# Patient Record
Sex: MALE | Race: WHITE | NOT HISPANIC OR LATINO | Employment: UNEMPLOYED | URBAN - NONMETROPOLITAN AREA
[De-identification: names, ages, dates, MRNs, and addresses within clinical notes are randomized per-mention and may not be internally consistent; named-entity substitution may affect disease eponyms.]

---

## 2024-08-18 ENCOUNTER — HOSPITAL ENCOUNTER (EMERGENCY)
Facility: OTHER | Age: 44
Discharge: HOME | End: 2024-08-18
Attending: PHYSICIAN ASSISTANT | Admitting: PHYSICIAN ASSISTANT

## 2024-08-18 ENCOUNTER — APPOINTMENT (OUTPATIENT)
Dept: GENERAL RADIOLOGY | Facility: OTHER | Age: 44
End: 2024-08-18
Attending: PHYSICIAN ASSISTANT

## 2024-08-18 ENCOUNTER — APPOINTMENT (OUTPATIENT)
Dept: CT IMAGING | Facility: OTHER | Age: 44
End: 2024-08-18
Attending: PHYSICIAN ASSISTANT

## 2024-08-18 VITALS
HEIGHT: 69 IN | WEIGHT: 133 LBS | DIASTOLIC BLOOD PRESSURE: 91 MMHG | BODY MASS INDEX: 19.7 KG/M2 | OXYGEN SATURATION: 96 % | SYSTOLIC BLOOD PRESSURE: 129 MMHG | RESPIRATION RATE: 18 BRPM | HEART RATE: 76 BPM | TEMPERATURE: 97.2 F

## 2024-08-18 DIAGNOSIS — K52.9 ENTERITIS: ICD-10-CM

## 2024-08-18 DIAGNOSIS — R10.9 ABDOMINAL PAIN: ICD-10-CM

## 2024-08-18 DIAGNOSIS — R11.2 NAUSEA VOMITING AND DIARRHEA: ICD-10-CM

## 2024-08-18 DIAGNOSIS — R19.7 NAUSEA VOMITING AND DIARRHEA: ICD-10-CM

## 2024-08-18 LAB
ALBUMIN SERPL BCG-MCNC: 3.7 G/DL (ref 3.5–5.2)
ALBUMIN UR-MCNC: 30 MG/DL
ALP SERPL-CCNC: 168 U/L (ref 40–150)
ALT SERPL W P-5'-P-CCNC: 10 U/L (ref 0–70)
AMPHETAMINES UR QL SCN: ABNORMAL
ANION GAP SERPL CALCULATED.3IONS-SCNC: 10 MMOL/L (ref 7–15)
APPEARANCE UR: CLEAR
AST SERPL W P-5'-P-CCNC: 20 U/L (ref 0–45)
BARBITURATES UR QL SCN: ABNORMAL
BASOPHILS # BLD AUTO: 0 10E3/UL (ref 0–0.2)
BASOPHILS NFR BLD AUTO: 0 %
BENZODIAZ UR QL SCN: ABNORMAL
BILIRUB SERPL-MCNC: 0.5 MG/DL
BILIRUB UR QL STRIP: NEGATIVE
BUN SERPL-MCNC: 11.6 MG/DL (ref 6–20)
BZE UR QL SCN: ABNORMAL
CALCIUM SERPL-MCNC: 8.7 MG/DL (ref 8.8–10.4)
CANNABINOIDS UR QL SCN: ABNORMAL
CHLORIDE SERPL-SCNC: 99 MMOL/L (ref 98–107)
COLOR UR AUTO: YELLOW
CREAT SERPL-MCNC: 0.77 MG/DL (ref 0.67–1.17)
EGFRCR SERPLBLD CKD-EPI 2021: >90 ML/MIN/1.73M2
EOSINOPHIL # BLD AUTO: 0.4 10E3/UL (ref 0–0.7)
EOSINOPHIL NFR BLD AUTO: 5 %
ERYTHROCYTE [DISTWIDTH] IN BLOOD BY AUTOMATED COUNT: 14.5 % (ref 10–15)
FENTANYL UR QL: ABNORMAL
GLUCOSE SERPL-MCNC: 152 MG/DL (ref 70–99)
GLUCOSE UR STRIP-MCNC: NEGATIVE MG/DL
HCO3 SERPL-SCNC: 26 MMOL/L (ref 22–29)
HCT VFR BLD AUTO: 41.9 % (ref 40–53)
HGB BLD-MCNC: 13.6 G/DL (ref 13.3–17.7)
HGB UR QL STRIP: NEGATIVE
HOLD SPECIMEN: NORMAL
HOLD SPECIMEN: NORMAL
IMM GRANULOCYTES # BLD: 0 10E3/UL
IMM GRANULOCYTES NFR BLD: 0 %
KETONES UR STRIP-MCNC: NEGATIVE MG/DL
LACTATE SERPL-SCNC: 1.5 MMOL/L (ref 0.7–2)
LEUKOCYTE ESTERASE UR QL STRIP: NEGATIVE
LIPASE SERPL-CCNC: 7 U/L (ref 13–60)
LYMPHOCYTES # BLD AUTO: 1.9 10E3/UL (ref 0.8–5.3)
LYMPHOCYTES NFR BLD AUTO: 22 %
MAGNESIUM SERPL-MCNC: 1.9 MG/DL (ref 1.7–2.3)
MCH RBC QN AUTO: 29.3 PG (ref 26.5–33)
MCHC RBC AUTO-ENTMCNC: 32.5 G/DL (ref 31.5–36.5)
MCV RBC AUTO: 90 FL (ref 78–100)
MONOCYTES # BLD AUTO: 0.5 10E3/UL (ref 0–1.3)
MONOCYTES NFR BLD AUTO: 6 %
MUCOUS THREADS #/AREA URNS LPF: PRESENT /LPF
NEUTROPHILS # BLD AUTO: 5.8 10E3/UL (ref 1.6–8.3)
NEUTROPHILS NFR BLD AUTO: 68 %
NITRATE UR QL: NEGATIVE
NRBC # BLD AUTO: 0 10E3/UL
NRBC BLD AUTO-RTO: 0 /100
OPIATES UR QL SCN: ABNORMAL
PCP QUAL URINE (ROCHE): ABNORMAL
PH UR STRIP: 6.5 [PH] (ref 5–9)
PLATELET # BLD AUTO: 222 10E3/UL (ref 150–450)
POTASSIUM SERPL-SCNC: 4.3 MMOL/L (ref 3.4–5.3)
PROT SERPL-MCNC: 6 G/DL (ref 6.4–8.3)
RBC # BLD AUTO: 4.64 10E6/UL (ref 4.4–5.9)
RBC URINE: 1 /HPF
SODIUM SERPL-SCNC: 135 MMOL/L (ref 135–145)
SP GR UR STRIP: >1.05 (ref 1–1.03)
TROPONIN T SERPL HS-MCNC: <6 NG/L
TSH SERPL DL<=0.005 MIU/L-ACNC: 0.47 UIU/ML (ref 0.3–4.2)
UROBILINOGEN UR STRIP-MCNC: NORMAL MG/DL
WBC # BLD AUTO: 8.6 10E3/UL (ref 4–11)
WBC URINE: 9 /HPF

## 2024-08-18 PROCEDURE — 96361 HYDRATE IV INFUSION ADD-ON: CPT | Performed by: PHYSICIAN ASSISTANT

## 2024-08-18 PROCEDURE — 83605 ASSAY OF LACTIC ACID: CPT | Performed by: PHYSICIAN ASSISTANT

## 2024-08-18 PROCEDURE — 250N000011 HC RX IP 250 OP 636: Performed by: PHYSICIAN ASSISTANT

## 2024-08-18 PROCEDURE — 96375 TX/PRO/DX INJ NEW DRUG ADDON: CPT | Performed by: PHYSICIAN ASSISTANT

## 2024-08-18 PROCEDURE — 84484 ASSAY OF TROPONIN QUANT: CPT | Performed by: PHYSICIAN ASSISTANT

## 2024-08-18 PROCEDURE — 83735 ASSAY OF MAGNESIUM: CPT | Performed by: PHYSICIAN ASSISTANT

## 2024-08-18 PROCEDURE — 74177 CT ABD & PELVIS W/CONTRAST: CPT

## 2024-08-18 PROCEDURE — 96376 TX/PRO/DX INJ SAME DRUG ADON: CPT | Performed by: PHYSICIAN ASSISTANT

## 2024-08-18 PROCEDURE — 96374 THER/PROPH/DIAG INJ IV PUSH: CPT | Mod: XU | Performed by: PHYSICIAN ASSISTANT

## 2024-08-18 PROCEDURE — 99285 EMERGENCY DEPT VISIT HI MDM: CPT | Mod: 25 | Performed by: PHYSICIAN ASSISTANT

## 2024-08-18 PROCEDURE — 80307 DRUG TEST PRSMV CHEM ANLYZR: CPT | Performed by: PHYSICIAN ASSISTANT

## 2024-08-18 PROCEDURE — 71045 X-RAY EXAM CHEST 1 VIEW: CPT

## 2024-08-18 PROCEDURE — 81001 URINALYSIS AUTO W/SCOPE: CPT | Mod: XU | Performed by: PHYSICIAN ASSISTANT

## 2024-08-18 PROCEDURE — 99285 EMERGENCY DEPT VISIT HI MDM: CPT | Performed by: PHYSICIAN ASSISTANT

## 2024-08-18 PROCEDURE — 80053 COMPREHEN METABOLIC PANEL: CPT | Performed by: PHYSICIAN ASSISTANT

## 2024-08-18 PROCEDURE — 85025 COMPLETE CBC W/AUTO DIFF WBC: CPT | Performed by: PHYSICIAN ASSISTANT

## 2024-08-18 PROCEDURE — 84443 ASSAY THYROID STIM HORMONE: CPT | Performed by: PHYSICIAN ASSISTANT

## 2024-08-18 PROCEDURE — 36415 COLL VENOUS BLD VENIPUNCTURE: CPT | Performed by: PHYSICIAN ASSISTANT

## 2024-08-18 PROCEDURE — 250N000013 HC RX MED GY IP 250 OP 250 PS 637: Performed by: PHYSICIAN ASSISTANT

## 2024-08-18 PROCEDURE — 83690 ASSAY OF LIPASE: CPT | Performed by: PHYSICIAN ASSISTANT

## 2024-08-18 PROCEDURE — 258N000003 HC RX IP 258 OP 636: Performed by: PHYSICIAN ASSISTANT

## 2024-08-18 PROCEDURE — 250N000009 HC RX 250: Performed by: PHYSICIAN ASSISTANT

## 2024-08-18 PROCEDURE — 93010 ELECTROCARDIOGRAM REPORT: CPT | Performed by: INTERNAL MEDICINE

## 2024-08-18 PROCEDURE — 93005 ELECTROCARDIOGRAM TRACING: CPT | Performed by: PHYSICIAN ASSISTANT

## 2024-08-18 RX ORDER — ONDANSETRON 2 MG/ML
4 INJECTION INTRAMUSCULAR; INTRAVENOUS EVERY 30 MIN PRN
Status: DISCONTINUED | OUTPATIENT
Start: 2024-08-18 | End: 2024-08-18 | Stop reason: HOSPADM

## 2024-08-18 RX ORDER — HYDROXYZINE PAMOATE 25 MG/1
25 CAPSULE ORAL ONCE
Status: COMPLETED | OUTPATIENT
Start: 2024-08-18 | End: 2024-08-18

## 2024-08-18 RX ORDER — KETOROLAC TROMETHAMINE 15 MG/ML
15 INJECTION, SOLUTION INTRAMUSCULAR; INTRAVENOUS ONCE
Status: COMPLETED | OUTPATIENT
Start: 2024-08-18 | End: 2024-08-18

## 2024-08-18 RX ORDER — ONDANSETRON 4 MG/1
4 TABLET, ORALLY DISINTEGRATING ORAL EVERY 6 HOURS PRN
Qty: 10 TABLET | Refills: 0 | Status: SHIPPED | OUTPATIENT
Start: 2024-08-18

## 2024-08-18 RX ORDER — OXYCODONE AND ACETAMINOPHEN 5; 325 MG/1; MG/1
1 TABLET ORAL EVERY 6 HOURS PRN
Qty: 6 TABLET | Refills: 0 | Status: SHIPPED | OUTPATIENT
Start: 2024-08-18

## 2024-08-18 RX ORDER — IOPAMIDOL 755 MG/ML
76 INJECTION, SOLUTION INTRAVASCULAR ONCE
Status: COMPLETED | OUTPATIENT
Start: 2024-08-18 | End: 2024-08-18

## 2024-08-18 RX ADMIN — PANTOPRAZOLE SODIUM 40 MG: 40 INJECTION, POWDER, FOR SOLUTION INTRAVENOUS at 14:25

## 2024-08-18 RX ADMIN — SODIUM CHLORIDE 60 ML: 9 INJECTION, SOLUTION INTRAVENOUS at 15:27

## 2024-08-18 RX ADMIN — HYDROMORPHONE HYDROCHLORIDE 1 MG: 1 INJECTION, SOLUTION INTRAMUSCULAR; INTRAVENOUS; SUBCUTANEOUS at 14:26

## 2024-08-18 RX ADMIN — SODIUM CHLORIDE 1809 ML: 9 INJECTION, SOLUTION INTRAVENOUS at 14:23

## 2024-08-18 RX ADMIN — SODIUM CHLORIDE, POTASSIUM CHLORIDE, SODIUM LACTATE AND CALCIUM CHLORIDE 500 ML: 600; 310; 30; 20 INJECTION, SOLUTION INTRAVENOUS at 16:58

## 2024-08-18 RX ADMIN — IOPAMIDOL 76 ML: 755 INJECTION, SOLUTION INTRAVENOUS at 15:26

## 2024-08-18 RX ADMIN — HYDROXYZINE PAMOATE 25 MG: 25 CAPSULE ORAL at 16:22

## 2024-08-18 RX ADMIN — HYDROMORPHONE HYDROCHLORIDE 1 MG: 1 INJECTION, SOLUTION INTRAMUSCULAR; INTRAVENOUS; SUBCUTANEOUS at 16:22

## 2024-08-18 RX ADMIN — KETOROLAC TROMETHAMINE 15 MG: 15 INJECTION, SOLUTION INTRAMUSCULAR; INTRAVENOUS at 14:25

## 2024-08-18 ASSESSMENT — COLUMBIA-SUICIDE SEVERITY RATING SCALE - C-SSRS
1. IN THE PAST MONTH, HAVE YOU WISHED YOU WERE DEAD OR WISHED YOU COULD GO TO SLEEP AND NOT WAKE UP?: NO
2. HAVE YOU ACTUALLY HAD ANY THOUGHTS OF KILLING YOURSELF IN THE PAST MONTH?: NO
6. HAVE YOU EVER DONE ANYTHING, STARTED TO DO ANYTHING, OR PREPARED TO DO ANYTHING TO END YOUR LIFE?: NO

## 2024-08-18 ASSESSMENT — ACTIVITIES OF DAILY LIVING (ADL)
ADLS_ACUITY_SCORE: 35
ADLS_ACUITY_SCORE: 33

## 2024-08-18 NOTE — ED PROVIDER NOTES
"      EMERGENCY DEPARTMENT ENCOUNTER      NAME: Osvaldo Montague  AGE: 44 year old male  YOB: 1980  MRN: [id number]  EVALUATION DATE & TIME: No admission date for patient encounter.    PCP: No primary care provider on file.    ED PROVIDER: Pascual Al PA-C       CHIEF COMPLAINT:  Chief Complaint   Patient presents with    Abdominal Pain    Nausea, Vomiting, & Diarrhea         HPI  Osvaldo Montague is a pleasant 44 year old male who presents to the ER via a private vehicle for complaints of upper abdominal pain.  Patient states that symptoms began yesterday but has been worsening.  Patient been having nausea, vomiting, and diarrhea.  No bloody or melanotic stools.  No hematemesis.  Pain is described as moderate without radiation.  Nothing makes pain better or worse.  Patient states that he has a remote history of pancreatitis many years ago when he used to drink alcohol.  Currently on amoxicillin for dental infection.  Denies any alcohol use currently.  Denies any recent travel.  Denies any injury or trauma.  Patient also feeling short of breath and lightheaded.  Denies chest pain.      REVIEW OF SYSTEMS   Review of Systems  As above, otherwise ROS is unremarkable.      PAST MEDICAL HISTORY:  No past medical history on file.      PAST SURGICAL HISTORY:  No past surgical history on file.      CURRENT MEDICATIONS:    No current outpatient medications      ALLERGIES:  Allergies   Allergen Reactions    Tramadol          FAMILY HISTORY:  No family history on file.      SOCIAL HISTORY:        ==================================================================================================================================    PHYSICAL EXAM    VITAL SIGNS: BP (!) 129/91   Pulse 76   Temp 97.2  F (36.2  C) (Tympanic)   Resp 18   Ht 1.753 m (5' 9\")   Wt 60.3 kg (133 lb)   SpO2 96%   BMI 19.64 kg/m      Patient Vitals for the past 24 hrs:   BP Temp Temp src Pulse Resp SpO2 Height Weight " "  08/18/24 1900 (!) 129/91 -- -- 76 -- 96 % -- --   08/18/24 1812 133/89 -- -- -- -- 97 % -- --   08/18/24 1757 (!) 133/90 -- -- -- -- 97 % -- --   08/18/24 1742 (!) 137/92 -- -- -- -- 99 % -- --   08/18/24 1727 125/87 -- -- -- -- 97 % -- --   08/18/24 1712 125/88 -- -- -- -- 97 % -- --   08/18/24 1657 120/84 -- -- -- -- 97 % -- --   08/18/24 1612 123/88 -- -- -- -- 98 % -- --   08/18/24 1557 119/82 -- -- -- -- 97 % -- --   08/18/24 1542 121/87 -- -- -- -- 98 % -- --   08/18/24 1512 110/80 -- -- -- -- 98 % -- --   08/18/24 1458 108/75 -- -- 71 -- 97 % -- --   08/18/24 1443 113/80 -- -- 72 -- 98 % -- --   08/18/24 1428 118/79 -- -- -- -- -- -- --   08/18/24 1349 137/87 97.2  F (36.2  C) Tympanic 93 18 98 % 1.753 m (5' 9\") 60.3 kg (133 lb)       Physical Exam  Vitals and nursing note reviewed.   Constitutional:       General: He is not in acute distress.     Appearance: Normal appearance. He is well-developed. He is ill-appearing. He is not diaphoretic.   HENT:      Nose: Nose normal.      Mouth/Throat:      Mouth: Mucous membranes are moist.      Pharynx: Oropharynx is clear.   Eyes:      Conjunctiva/sclera: Conjunctivae normal.   Cardiovascular:      Rate and Rhythm: Normal rate and regular rhythm.      Pulses: Normal pulses.      Heart sounds: Normal heart sounds.   Pulmonary:      Effort: Pulmonary effort is normal.      Breath sounds: Normal breath sounds.   Chest:      Chest wall: No tenderness.   Abdominal:      General: Abdomen is flat. Bowel sounds are normal.      Palpations: Abdomen is soft.      Tenderness: There is abdominal tenderness in the epigastric area and left upper quadrant. There is guarding. There is no right CVA tenderness, left CVA tenderness or rebound.   Musculoskeletal:         General: Normal range of motion.      Cervical back: Normal range of motion.   Skin:     General: Skin is warm and dry.      Coloration: Skin is not jaundiced.   Neurological:      General: No focal deficit present. "      Mental Status: He is alert.   Psychiatric:         Mood and Affect: Mood normal.            ==================================================================================================================================    LABS & RADIOLOGY:  All pertinent labs reviewed and interpreted. Reviewed all pertinent imaging. Please see official radiology report.  Results for orders placed or performed during the hospital encounter of 08/18/24   XR Chest Port 1 View    Impression    IMPRESSION:      No acute cardiopulmonary process.      ROSANA GRECO MD         SYSTEM ID:  [id number]   CT Abdomen Pelvis w Contrast    Impression    IMPRESSION:  Several loops of small bowel throughout the abdomen demonstrates  subtle wall thickening and associated mesenteric edema, likely due to  infectious or inflammatory enteritis. Small volume free fluid in the  pelvis. No pneumoperitoneum.    ROSANA GRECO MD         SYSTEM ID:  [id number]   Comprehensive metabolic panel   Result Value Ref Range    Sodium 135 135 - 145 mmol/L    Potassium 4.3 3.4 - 5.3 mmol/L    Carbon Dioxide (CO2) 26 22 - 29 mmol/L    Anion Gap 10 7 - 15 mmol/L    Urea Nitrogen 11.6 6.0 - 20.0 mg/dL    Creatinine 0.77 0.67 - 1.17 mg/dL    GFR Estimate >90 >60 mL/min/1.73m2    Calcium 8.7 (L) 8.8 - 10.4 mg/dL    Chloride 99 98 - 107 mmol/L    Glucose 152 (H) 70 - 99 mg/dL    Alkaline Phosphatase 168 (H) 40 - 150 U/L    AST 20 0 - 45 U/L    ALT 10 0 - 70 U/L    Protein Total 6.0 (L) 6.4 - 8.3 g/dL    Albumin 3.7 3.5 - 5.2 g/dL    Bilirubin Total 0.5 <=1.2 mg/dL   Lactic acid whole blood with 1x repeat in 2 hr when >2   Result Value Ref Range    Lactic Acid, Initial 1.5 0.7 - 2.0 mmol/L   Result Value Ref Range    Lipase 7 (L) 13 - 60 U/L   Result Value Ref Range    Troponin T, High Sensitivity <6 <=22 ng/L   Result Value Ref Range    Magnesium 1.9 1.7 - 2.3 mg/dL   TSH Reflex GH   Result Value Ref Range    TSH 0.47 0.30 - 4.20 uIU/mL   UA with Microscopic  reflex to Culture    Specimen: Urine, Midstream   Result Value Ref Range    Color Urine Yellow Colorless, Straw, Light Yellow, Yellow    Appearance Urine Clear Clear    Glucose Urine Negative Negative mg/dL    Bilirubin Urine Negative Negative    Ketones Urine Negative Negative mg/dL    Specific Gravity Urine >1.050 (H) 1.000 - 1.030    Blood Urine Negative Negative    pH Urine 6.5 5.0 - 9.0    Protein Albumin Urine 30 (A) Negative mg/dL    Urobilinogen Urine Normal Normal, 2.0 mg/dL    Nitrite Urine Negative Negative    Leukocyte Esterase Urine Negative Negative    Mucus Urine Present (A) None Seen /LPF    RBC Urine 1 <=2 /HPF    WBC Urine 9 (H) <=5 /HPF   CBC with platelets and differential   Result Value Ref Range    WBC Count 8.6 4.0 - 11.0 10e3/uL    RBC Count 4.64 4.40 - 5.90 10e6/uL    Hemoglobin 13.6 13.3 - 17.7 g/dL    Hematocrit 41.9 40.0 - 53.0 %    MCV 90 78 - 100 fL    MCH 29.3 26.5 - 33.0 pg    MCHC 32.5 31.5 - 36.5 g/dL    RDW 14.5 10.0 - 15.0 %    Platelet Count 222 150 - 450 10e3/uL    % Neutrophils 68 %    % Lymphocytes 22 %    % Monocytes 6 %    % Eosinophils 5 %    % Basophils 0 %    % Immature Granulocytes 0 %    NRBCs per 100 WBC 0 <1 /100    Absolute Neutrophils 5.8 1.6 - 8.3 10e3/uL    Absolute Lymphocytes 1.9 0.8 - 5.3 10e3/uL    Absolute Monocytes 0.5 0.0 - 1.3 10e3/uL    Absolute Eosinophils 0.4 0.0 - 0.7 10e3/uL    Absolute Basophils 0.0 0.0 - 0.2 10e3/uL    Absolute Immature Granulocytes 0.0 <=0.4 10e3/uL    Absolute NRBCs 0.0 10e3/uL   Extra Blue Top Tube   Result Value Ref Range    Hold Specimen [facility]    Extra Red Top Tube   Result Value Ref Range    Hold Specimen [facility]    Urine Drug Screen Panel   Result Value Ref Range    Amphetamines Urine Screen Negative Screen Negative    Barbituates Urine Screen Negative Screen Negative    Benzodiazepine Urine Screen Positive (A) Screen Negative    Cannabinoids Urine Screen Negative Screen Negative    Cocaine Urine Screen Negative Screen Negative     "Fentanyl Qual Urine Screen Negative Screen Negative    Opiates Urine Screen Positive (A) Screen Negative    PCP Urine Screen Negative Screen Negative     CT Abdomen Pelvis w Contrast   Final Result   IMPRESSION:   Several loops of small bowel throughout the abdomen demonstrates   subtle wall thickening and associated mesenteric edema, likely due to   infectious or inflammatory enteritis. Small volume free fluid in the   pelvis. No pneumoperitoneum.      ROSANA GRECO MD            SYSTEM ID:  [id number]      XR Chest Port 1 View   Final Result   IMPRESSION:        No acute cardiopulmonary process.        ROSANA GRECO MD            SYSTEM ID:  [id number]            EKG:    EKG reviewed at 1439.  1) Rhythm: NSR  2) Rate: ventricular rate 73 bpm.  3) QRS Axis: No axis deviation  4) P waves/ MA interval: Sinus. Nml UNIQUE. No atrial enlargement  5) QRS complex: Narrow. No BBB. No ventricular hypertrophy. No pathological Q waves.  6) ST Segment: No acute ST segment elevation or depression  7) T waves: No T wave inversions. No peaked or flattened T waves.     I have independently reviewed and interpreted today's EKG, pending cardiologist over read.         ==================================================================================================================================    ED COURSE, MEDICAL DECISION MAKING, FINAL IMPRESSION AND PLAN:     Assessment / Plan:  1. Enteritis    2. Abdominal pain    3. Nausea vomiting and diarrhea        The patient was interviewed and examined.  HPI and physical exam as below.  Differential diagnosis and MDM Key Documentation Elements as below.  Vitals, triage note, and nursing notes were reviewed.  BP (!) 129/91   Pulse 76   Temp 97.2  F (36.2  C) (Tympanic)   Resp 18   Ht 1.753 m (5' 9\")   Wt 60.3 kg (133 lb)   SpO2 96%   BMI 19.64 kg/m      Differential includes but is not limited to gastritis, gastroenteritis, bowel obstruction, pancreatitis, cholecystitis, peptic " ulcer disease, colitis    Patient was afebrile with otherwise normal vitals.  Patient was in no acute distress but did appear to be slightly ill-appearing and in discomfort from pain.  Evaluation today shows significant discomfort to the epigastric and left upper quadrant regions with mild guarding.  No rebound.  Lungs are clear.  No chest wall tenderness.  No flank or CVA tenderness.  Heart was regular.  Exam otherwise benign.    CT abdomen pelvis IV contrast today showed enteritis.  UDS positive for benzodiazepines and opiates.  UA showed dehydration with specific gravity greater than 1.05.  No leukocytosis or anemia.  Normal lactic acid.  Glucose 152, most likely secondary to pain.  Alk phos 168.    Patient feeling better with use of interventions here in the ER.  Stool cultures were ordered but patient unable to give a stool sample.  Will defer antibiotics until patient is able to give a stool sample.  A take-home stool sample kit was given to patient.  A small course of antibiotics and Zofran was given.  Recommend close up with primary care doctor.  If patient is able to give a stool culture, will call patient if patient comes back positive for C. Difficile abnormal stool results.  Patient discharged home in stable and improved condition.    Pertinent Labs & Imaging studies reviewed. (See chart for details)  Results for orders placed or performed during the hospital encounter of 08/18/24   XR Chest Port 1 View    Impression    IMPRESSION:      No acute cardiopulmonary process.      ROSANA GRECO MD         SYSTEM ID:  [id number]   CT Abdomen Pelvis w Contrast    Impression    IMPRESSION:  Several loops of small bowel throughout the abdomen demonstrates  subtle wall thickening and associated mesenteric edema, likely due to  infectious or inflammatory enteritis. Small volume free fluid in the  pelvis. No pneumoperitoneum.    ROSANA GRECO MD         SYSTEM ID:  [id number]   Comprehensive metabolic panel    Result Value Ref Range    Sodium 135 135 - 145 mmol/L    Potassium 4.3 3.4 - 5.3 mmol/L    Carbon Dioxide (CO2) 26 22 - 29 mmol/L    Anion Gap 10 7 - 15 mmol/L    Urea Nitrogen 11.6 6.0 - 20.0 mg/dL    Creatinine 0.77 0.67 - 1.17 mg/dL    GFR Estimate >90 >60 mL/min/1.73m2    Calcium 8.7 (L) 8.8 - 10.4 mg/dL    Chloride 99 98 - 107 mmol/L    Glucose 152 (H) 70 - 99 mg/dL    Alkaline Phosphatase 168 (H) 40 - 150 U/L    AST 20 0 - 45 U/L    ALT 10 0 - 70 U/L    Protein Total 6.0 (L) 6.4 - 8.3 g/dL    Albumin 3.7 3.5 - 5.2 g/dL    Bilirubin Total 0.5 <=1.2 mg/dL   Lactic acid whole blood with 1x repeat in 2 hr when >2   Result Value Ref Range    Lactic Acid, Initial 1.5 0.7 - 2.0 mmol/L   Result Value Ref Range    Lipase 7 (L) 13 - 60 U/L   Result Value Ref Range    Troponin T, High Sensitivity <6 <=22 ng/L   Result Value Ref Range    Magnesium 1.9 1.7 - 2.3 mg/dL   TSH Reflex GH   Result Value Ref Range    TSH 0.47 0.30 - 4.20 uIU/mL   UA with Microscopic reflex to Culture    Specimen: Urine, Midstream   Result Value Ref Range    Color Urine Yellow Colorless, Straw, Light Yellow, Yellow    Appearance Urine Clear Clear    Glucose Urine Negative Negative mg/dL    Bilirubin Urine Negative Negative    Ketones Urine Negative Negative mg/dL    Specific Gravity Urine >1.050 (H) 1.000 - 1.030    Blood Urine Negative Negative    pH Urine 6.5 5.0 - 9.0    Protein Albumin Urine 30 (A) Negative mg/dL    Urobilinogen Urine Normal Normal, 2.0 mg/dL    Nitrite Urine Negative Negative    Leukocyte Esterase Urine Negative Negative    Mucus Urine Present (A) None Seen /LPF    RBC Urine 1 <=2 /HPF    WBC Urine 9 (H) <=5 /HPF   CBC with platelets and differential   Result Value Ref Range    WBC Count 8.6 4.0 - 11.0 10e3/uL    RBC Count 4.64 4.40 - 5.90 10e6/uL    Hemoglobin 13.6 13.3 - 17.7 g/dL    Hematocrit 41.9 40.0 - 53.0 %    MCV 90 78 - 100 fL    MCH 29.3 26.5 - 33.0 pg    MCHC 32.5 31.5 - 36.5 g/dL    RDW 14.5 10.0 - 15.0 %     "Platelet Count 222 150 - 450 10e3/uL    % Neutrophils 68 %    % Lymphocytes 22 %    % Monocytes 6 %    % Eosinophils 5 %    % Basophils 0 %    % Immature Granulocytes 0 %    NRBCs per 100 WBC 0 <1 /100    Absolute Neutrophils 5.8 1.6 - 8.3 10e3/uL    Absolute Lymphocytes 1.9 0.8 - 5.3 10e3/uL    Absolute Monocytes 0.5 0.0 - 1.3 10e3/uL    Absolute Eosinophils 0.4 0.0 - 0.7 10e3/uL    Absolute Basophils 0.0 0.0 - 0.2 10e3/uL    Absolute Immature Granulocytes 0.0 <=0.4 10e3/uL    Absolute NRBCs 0.0 10e3/uL   Extra Blue Top Tube   Result Value Ref Range    Hold Specimen [facility]    Extra Red Top Tube   Result Value Ref Range    Hold Specimen [facility]    Urine Drug Screen Panel   Result Value Ref Range    Amphetamines Urine Screen Negative Screen Negative    Barbituates Urine Screen Negative Screen Negative    Benzodiazepine Urine Screen Positive (A) Screen Negative    Cannabinoids Urine Screen Negative Screen Negative    Cocaine Urine Screen Negative Screen Negative    Fentanyl Qual Urine Screen Negative Screen Negative    Opiates Urine Screen Positive (A) Screen Negative    PCP Urine Screen Negative Screen Negative     No results found for: \"ABORH\"      Reassessments, Medications, Interventions, & Response to Treatments:  -as above    Medications given during today's ER visit:  Medications   sodium chloride 0.9% BOLUS 1,809 mL (0 mLs Intravenous Stopped 8/18/24 1600)   ketorolac (TORADOL) injection 15 mg (15 mg Intravenous $Given 8/18/24 1425)   HYDROmorphone (DILAUDID) injection 1 mg (1 mg Intravenous $Given 8/18/24 1426)   pantoprazole (PROTONIX) IV push injection 40 mg (40 mg Intravenous $Given 8/18/24 1425)   iopamidol (ISOVUE-370) solution 76 mL (76 mLs Intravenous $Given 8/18/24 1526)   HYDROmorphone (DILAUDID) injection 1 mg (1 mg Intravenous $Given 8/18/24 1622)   hydrOXYzine cornell (VISTARIL) capsule 25 mg (25 mg Oral $Given 8/18/24 1622)   lactated ringers BOLUS 500 mL (0 mLs Intravenous Stopped 8/18/24 1730) "       Consultations:  None    Decision Rules, Medical Calculators, and Risk Stratification Tools:  None    MDM Key Documentation Elements for Patient's Evaluation:  Differential diagnosis to include high risk considerations: As above  Escalation to admission/observation considered: Admission/observation considered, but patient does not meet admission criteria  Discussions and management with other clinicians:    3a. Independent interpretation of testing performed by another health professional:  -No  3b. Discussion of management or test interpretation with another health professional: -No  Independent interpretation of tests:  Ordering and/or review of 3+ test(s)  Discussion of test interpretations with radiology:  No  History obtained from source other than patient or assessment requiring an independent historian:  No  Review of non-ED/external records:  review of 3+ records  Diagnostic tests considered but not ultimately performed/deferred:  -Stool samples  Prescription medications considered but not prescribed:  -Augmentin, Cipro/Flagyl  Chronic conditions affecting care:  -Opiate use disorder  Care affected by social determinants of health:  -None    The patient's management involved:   - Laboratory studies  - Imaging studies  - Parenteral controlled substance  - Prescription drug management      A shared decision making model was used. Time was taken to answer all questions.  Patient and/or associated parties understood and were agreeable to treatment plan.  Plan and all results were discussed. Warning signs and close return precautions to return to the ED given. Copy of results given. Discharged in stable condition. Discharged with discharge instructions outlining plan for further care and follow up.      New prescriptions started at today's ER visit  Discharge Medication List as of 8/18/2024  6:56 PM        START taking these medications    Details   ondansetron (ZOFRAN ODT) 4 MG ODT tab Take 1 tablet (4 mg)  by mouth every 6 hours as needed for nausea, Disp-10 tablet, R-0, [facility]      oxyCODONE-acetaminophen (PERCOCET) 5-325 MG tablet Take 1 tablet by mouth every 6 hours as needed for severe pain, Disp-6 tablet, R-0, [facility]             ==================================================================================================================================      Nolan SCHULER PA-C, personally performed the services described in this documentation, and it is both accurate and complete.       Pascual Al PA-C  08/18/24 1410

## 2024-08-18 NOTE — ED TRIAGE NOTES
"Patient arrives to ED from home with complaints of abd pain that started 2 days ago and progressively getting worse. 10/10 sharp stabbing pain. Hx of pancreatitis. Not on medications.  /87   Pulse 93   Temp 97.2  F (36.2  C) (Tympanic)   Resp 18   Ht 1.753 m (5' 9\")   Wt 60.3 kg (133 lb)   SpO2 98%   BMI 19.64 kg/m    Nancy Rosario RN on 8/18/2024 at 1:54 PM     Triage Assessment (Adult)       Row Name 08/18/24 1359          Triage Assessment    Airway WDL WDL        Respiratory WDL    Respiratory WDL WDL        Skin Circulation/Temperature WDL    Skin Circulation/Temperature WDL WDL        Cardiac WDL    Cardiac WDL WDL        Peripheral/Neurovascular WDL    Peripheral Neurovascular WDL WDL        Cognitive/Neuro/Behavioral WDL    Cognitive/Neuro/Behavioral WDL WDL                     "

## 2024-08-18 NOTE — Clinical Note
Osvaldo Montague was seen and treated in our emergency department on 8/18/2024.  He may return to work on 08/20/2024.       If you have any questions or concerns, please don't hesitate to call.      Pascual Al PA-C

## 2024-08-19 LAB
ATRIAL RATE - MUSE: 73 BPM
DIASTOLIC BLOOD PRESSURE - MUSE: NORMAL MMHG
INTERPRETATION ECG - MUSE: NORMAL
P AXIS - MUSE: 55 DEGREES
PR INTERVAL - MUSE: 142 MS
QRS DURATION - MUSE: 100 MS
QT - MUSE: 418 MS
QTC - MUSE: 460 MS
R AXIS - MUSE: 29 DEGREES
SYSTOLIC BLOOD PRESSURE - MUSE: NORMAL MMHG
T AXIS - MUSE: 56 DEGREES
VENTRICULAR RATE- MUSE: 73 BPM

## 2024-08-20 ENCOUNTER — APPOINTMENT (OUTPATIENT)
Dept: CT IMAGING | Facility: OTHER | Age: 44
End: 2024-08-20

## 2024-08-20 ENCOUNTER — HOSPITAL ENCOUNTER (EMERGENCY)
Facility: OTHER | Age: 44
Discharge: HOME | End: 2024-08-20

## 2024-08-20 VITALS
RESPIRATION RATE: 18 BRPM | TEMPERATURE: 98.6 F | BODY MASS INDEX: 19.26 KG/M2 | WEIGHT: 130 LBS | HEART RATE: 62 BPM | HEIGHT: 69 IN | DIASTOLIC BLOOD PRESSURE: 107 MMHG | OXYGEN SATURATION: 97 % | SYSTOLIC BLOOD PRESSURE: 155 MMHG

## 2024-08-20 DIAGNOSIS — Z11.4 SCREENING FOR HIV (HUMAN IMMUNODEFICIENCY VIRUS): ICD-10-CM

## 2024-08-20 DIAGNOSIS — R10.9 ABDOMINAL PAIN, UNSPECIFIED ABDOMINAL LOCATION: ICD-10-CM

## 2024-08-20 DIAGNOSIS — Z11.59 NEED FOR HEPATITIS C SCREENING TEST: ICD-10-CM

## 2024-08-20 DIAGNOSIS — R19.7 DIARRHEA, UNSPECIFIED TYPE: ICD-10-CM

## 2024-08-20 LAB
ALBUMIN SERPL BCG-MCNC: 3.6 G/DL (ref 3.5–5.2)
ALBUMIN SERPL BCG-MCNC: 3.6 G/DL (ref 3.5–5.2)
ALBUMIN UR-MCNC: NEGATIVE MG/DL
ALP SERPL-CCNC: 194 U/L (ref 40–150)
ALP SERPL-CCNC: 194 U/L (ref 40–150)
ALT SERPL W P-5'-P-CCNC: 25 U/L (ref 0–70)
ALT SERPL W P-5'-P-CCNC: 25 U/L (ref 0–70)
ANION GAP SERPL CALCULATED.3IONS-SCNC: 10 MMOL/L (ref 7–15)
APPEARANCE UR: CLEAR
AST SERPL W P-5'-P-CCNC: 27 U/L (ref 0–45)
AST SERPL W P-5'-P-CCNC: 27 U/L (ref 0–45)
BASOPHILS # BLD AUTO: 0 10E3/UL (ref 0–0.2)
BASOPHILS NFR BLD AUTO: 0 %
BILIRUB DIRECT SERPL-MCNC: <0.2 MG/DL (ref 0–0.3)
BILIRUB SERPL-MCNC: 0.3 MG/DL
BILIRUB SERPL-MCNC: 0.3 MG/DL
BILIRUB UR QL STRIP: NEGATIVE
BUN SERPL-MCNC: 5.3 MG/DL (ref 6–20)
C DIFF TOX B STL QL: NEGATIVE
CALCIUM SERPL-MCNC: 8.6 MG/DL (ref 8.8–10.4)
CHLORIDE SERPL-SCNC: 100 MMOL/L (ref 98–107)
COLOR UR AUTO: NORMAL
CREAT SERPL-MCNC: 0.7 MG/DL (ref 0.67–1.17)
CRP SERPL-MCNC: 5.51 MG/L
EGFRCR SERPLBLD CKD-EPI 2021: >90 ML/MIN/1.73M2
EOSINOPHIL # BLD AUTO: 1.6 10E3/UL (ref 0–0.7)
EOSINOPHIL NFR BLD AUTO: 21 %
ERYTHROCYTE [DISTWIDTH] IN BLOOD BY AUTOMATED COUNT: 14.4 % (ref 10–15)
GLUCOSE SERPL-MCNC: 112 MG/DL (ref 70–99)
GLUCOSE UR STRIP-MCNC: NEGATIVE MG/DL
HCO3 SERPL-SCNC: 26 MMOL/L (ref 22–29)
HCT VFR BLD AUTO: 38 % (ref 40–53)
HGB BLD-MCNC: 12.5 G/DL (ref 13.3–17.7)
HGB UR QL STRIP: NEGATIVE
HOLD SPECIMEN: NORMAL
IMM GRANULOCYTES # BLD: 0 10E3/UL
IMM GRANULOCYTES NFR BLD: 0 %
KETONES UR STRIP-MCNC: NEGATIVE MG/DL
LACTATE SERPL-SCNC: 1.5 MMOL/L (ref 0.7–2)
LEUKOCYTE ESTERASE UR QL STRIP: NEGATIVE
LIPASE SERPL-CCNC: 7 U/L (ref 13–60)
LYMPHOCYTES # BLD AUTO: 2.8 10E3/UL (ref 0.8–5.3)
LYMPHOCYTES NFR BLD AUTO: 37 %
MAGNESIUM SERPL-MCNC: 1.5 MG/DL (ref 1.7–2.3)
MCH RBC QN AUTO: 29.5 PG (ref 26.5–33)
MCHC RBC AUTO-ENTMCNC: 32.9 G/DL (ref 31.5–36.5)
MCV RBC AUTO: 90 FL (ref 78–100)
MONOCYTES # BLD AUTO: 0.5 10E3/UL (ref 0–1.3)
MONOCYTES NFR BLD AUTO: 6 %
NEUTROPHILS # BLD AUTO: 2.6 10E3/UL (ref 1.6–8.3)
NEUTROPHILS NFR BLD AUTO: 35 %
NITRATE UR QL: NEGATIVE
NRBC # BLD AUTO: 0 10E3/UL
NRBC BLD AUTO-RTO: 0 /100
PH UR STRIP: 6 [PH] (ref 5–9)
PLATELET # BLD AUTO: 221 10E3/UL (ref 150–450)
POTASSIUM SERPL-SCNC: 3.6 MMOL/L (ref 3.4–5.3)
PROCALCITONIN SERPL IA-MCNC: 0.05 NG/ML
PROT SERPL-MCNC: 6.1 G/DL (ref 6.4–8.3)
PROT SERPL-MCNC: 6.1 G/DL (ref 6.4–8.3)
RBC # BLD AUTO: 4.24 10E6/UL (ref 4.4–5.9)
RBC URINE: 0 /HPF
SODIUM SERPL-SCNC: 136 MMOL/L (ref 135–145)
SP GR UR STRIP: 1.01 (ref 1–1.03)
UROBILINOGEN UR STRIP-MCNC: NORMAL MG/DL
WBC # BLD AUTO: 7.5 10E3/UL (ref 4–11)
WBC URINE: <1 /HPF

## 2024-08-20 PROCEDURE — 250N000011 HC RX IP 250 OP 636

## 2024-08-20 PROCEDURE — 87493 C DIFF AMPLIFIED PROBE: CPT

## 2024-08-20 PROCEDURE — 85004 AUTOMATED DIFF WBC COUNT: CPT

## 2024-08-20 PROCEDURE — 93005 ELECTROCARDIOGRAM TRACING: CPT

## 2024-08-20 PROCEDURE — 84145 PROCALCITONIN (PCT): CPT

## 2024-08-20 PROCEDURE — 96365 THER/PROPH/DIAG IV INF INIT: CPT | Mod: XU

## 2024-08-20 PROCEDURE — 83735 ASSAY OF MAGNESIUM: CPT

## 2024-08-20 PROCEDURE — 83690 ASSAY OF LIPASE: CPT

## 2024-08-20 PROCEDURE — 99285 EMERGENCY DEPT VISIT HI MDM: CPT

## 2024-08-20 PROCEDURE — 258N000003 HC RX IP 258 OP 636

## 2024-08-20 PROCEDURE — 93010 ELECTROCARDIOGRAM REPORT: CPT | Performed by: INTERNAL MEDICINE

## 2024-08-20 PROCEDURE — 86803 HEPATITIS C AB TEST: CPT

## 2024-08-20 PROCEDURE — 80053 COMPREHEN METABOLIC PANEL: CPT

## 2024-08-20 PROCEDURE — 96366 THER/PROPH/DIAG IV INF ADDON: CPT

## 2024-08-20 PROCEDURE — 250N000009 HC RX 250

## 2024-08-20 PROCEDURE — 81001 URINALYSIS AUTO W/SCOPE: CPT

## 2024-08-20 PROCEDURE — 87389 HIV-1 AG W/HIV-1&-2 AB AG IA: CPT

## 2024-08-20 PROCEDURE — 36415 COLL VENOUS BLD VENIPUNCTURE: CPT

## 2024-08-20 PROCEDURE — 74177 CT ABD & PELVIS W/CONTRAST: CPT

## 2024-08-20 PROCEDURE — 99285 EMERGENCY DEPT VISIT HI MDM: CPT | Mod: 25

## 2024-08-20 PROCEDURE — 86140 C-REACTIVE PROTEIN: CPT

## 2024-08-20 PROCEDURE — 83605 ASSAY OF LACTIC ACID: CPT

## 2024-08-20 PROCEDURE — 96375 TX/PRO/DX INJ NEW DRUG ADDON: CPT

## 2024-08-20 PROCEDURE — 87507 IADNA-DNA/RNA PROBE TQ 12-25: CPT

## 2024-08-20 PROCEDURE — 96376 TX/PRO/DX INJ SAME DRUG ADON: CPT

## 2024-08-20 RX ORDER — HYDROMORPHONE HYDROCHLORIDE 1 MG/ML
0.5 INJECTION, SOLUTION INTRAMUSCULAR; INTRAVENOUS; SUBCUTANEOUS ONCE
Status: COMPLETED | OUTPATIENT
Start: 2024-08-20 | End: 2024-08-20

## 2024-08-20 RX ORDER — IOPAMIDOL 755 MG/ML
75 INJECTION, SOLUTION INTRAVASCULAR ONCE
Status: COMPLETED | OUTPATIENT
Start: 2024-08-20 | End: 2024-08-20

## 2024-08-20 RX ORDER — MAGNESIUM SULFATE HEPTAHYDRATE 40 MG/ML
4 INJECTION, SOLUTION INTRAVENOUS ONCE
Status: COMPLETED | OUTPATIENT
Start: 2024-08-20 | End: 2024-08-20

## 2024-08-20 RX ORDER — KETOROLAC TROMETHAMINE 15 MG/ML
15 INJECTION, SOLUTION INTRAMUSCULAR; INTRAVENOUS ONCE
Status: COMPLETED | OUTPATIENT
Start: 2024-08-20 | End: 2024-08-20

## 2024-08-20 RX ORDER — FAMOTIDINE 20 MG/1
20 TABLET, FILM COATED ORAL 2 TIMES DAILY
Qty: 28 TABLET | Refills: 0 | Status: SHIPPED | OUTPATIENT
Start: 2024-08-20 | End: 2024-09-03

## 2024-08-20 RX ORDER — HYDROMORPHONE HYDROCHLORIDE 1 MG/ML
0.5 INJECTION, SOLUTION INTRAMUSCULAR; INTRAVENOUS; SUBCUTANEOUS
Status: COMPLETED | OUTPATIENT
Start: 2024-08-20 | End: 2024-08-20

## 2024-08-20 RX ADMIN — HYDROMORPHONE HYDROCHLORIDE 0.5 MG: 1 INJECTION, SOLUTION INTRAMUSCULAR; INTRAVENOUS; SUBCUTANEOUS at 17:19

## 2024-08-20 RX ADMIN — IOPAMIDOL 75 ML: 755 INJECTION, SOLUTION INTRAVENOUS at 20:28

## 2024-08-20 RX ADMIN — HYDROMORPHONE HYDROCHLORIDE 0.5 MG: 1 INJECTION, SOLUTION INTRAMUSCULAR; INTRAVENOUS; SUBCUTANEOUS at 20:20

## 2024-08-20 RX ADMIN — SODIUM CHLORIDE 1000 ML: 9 INJECTION, SOLUTION INTRAVENOUS at 17:19

## 2024-08-20 RX ADMIN — SODIUM CHLORIDE 60 ML: 9 INJECTION, SOLUTION INTRAVENOUS at 20:28

## 2024-08-20 RX ADMIN — FAMOTIDINE 20 MG: 10 INJECTION, SOLUTION INTRAVENOUS at 17:56

## 2024-08-20 RX ADMIN — KETOROLAC TROMETHAMINE 15 MG: 15 INJECTION, SOLUTION INTRAMUSCULAR; INTRAVENOUS at 17:19

## 2024-08-20 RX ADMIN — MAGNESIUM SULFATE HEPTAHYDRATE 4 G: 40 INJECTION, SOLUTION INTRAVENOUS at 17:34

## 2024-08-20 ASSESSMENT — ENCOUNTER SYMPTOMS
FEVER: 1
DIARRHEA: 1
CHILLS: 1
NAUSEA: 1
ABDOMINAL PAIN: 1

## 2024-08-20 ASSESSMENT — ACTIVITIES OF DAILY LIVING (ADL)
ADLS_ACUITY_SCORE: 35

## 2024-08-20 ASSESSMENT — COLUMBIA-SUICIDE SEVERITY RATING SCALE - C-SSRS
6. HAVE YOU EVER DONE ANYTHING, STARTED TO DO ANYTHING, OR PREPARED TO DO ANYTHING TO END YOUR LIFE?: NO
2. HAVE YOU ACTUALLY HAD ANY THOUGHTS OF KILLING YOURSELF IN THE PAST MONTH?: NO
1. IN THE PAST MONTH, HAVE YOU WISHED YOU WERE DEAD OR WISHED YOU COULD GO TO SLEEP AND NOT WAKE UP?: NO

## 2024-08-20 NOTE — ED PROVIDER NOTES
History     Chief Complaint   Patient presents with    Abdominal Pain     HPI  Osvaldo Montague is a 44 year old male with complaints of abdominal pain and diarrhea.  He reports his abdominal pain is worsened over the past 48 hours.  If he eats anything the pain worsens.  He reports feeling dizzy and lightheaded today.  He reports fever and chills.  He wakes up drenched in the middle of the night and sweat.  He has been on and off antibiotics over the past month for dental pain.  He was seen 2 days ago in the ER see note below.  He reports frequent stools especially if he eats.  He describes them as liquid odorous yellow and oily.  He denies nausea.  He denies any recent travel.    Seen in the ED (8/18/24) see copied note below.     CT abdomen pelvis IV contrast today showed enteritis.   Stool cultures were ordered but patient unable to give a stool sample.  Will defer antibiotics until patient is able to give a stool sample.  A take-home stool sample kit was given to patient.  A small course of antibiotics and Zofran was given.  Recommend close up with primary care doctor.  If patient is able to give a stool culture, will call patient if patient comes back positive for C. Difficile abnormal stool results.     Allergies:  Allergies   Allergen Reactions    Tramadol        Problem List:    There are no problems to display for this patient.       Past Medical History:    No past medical history on file.    Past Surgical History:    No past surgical history on file.    Family History:    No family history on file.    Social History:  Marital Status:  Single [1]        Medications:    famotidine (PEPCID) 20 MG tablet  omeprazole (PRILOSEC) 20 MG DR capsule  ondansetron (ZOFRAN ODT) 4 MG ODT tab  oxyCODONE-acetaminophen (PERCOCET) 5-325 MG tablet          Review of Systems   Constitutional:  Positive for chills and fever.   Cardiovascular:  Positive for leg swelling.   Gastrointestinal:  Positive for abdominal pain,  "diarrhea and nausea.   All other systems reviewed and are negative.      Physical Exam   BP: (!) 160/101  Pulse: 77  Temp: 98.6  F (37  C)  Resp: 18  Height: 175.3 cm (5' 9\")  Weight: 59 kg (130 lb)  SpO2: 99 %      Physical Exam  Vitals and nursing note reviewed.   Constitutional:       General: He is not in acute distress.     Appearance: He is not ill-appearing.   HENT:      Head: Normocephalic.      Mouth/Throat:      Mouth: Mucous membranes are moist.   Cardiovascular:      Rate and Rhythm: Normal rate and regular rhythm.      Heart sounds: Normal heart sounds.   Pulmonary:      Effort: Pulmonary effort is normal.      Breath sounds: Normal breath sounds.   Abdominal:      General: Bowel sounds are normal. There is no distension.      Palpations: Abdomen is soft.      Tenderness: There is abdominal tenderness in the left lower quadrant.   Skin:     General: Skin is warm and dry.   Neurological:      General: No focal deficit present.      Mental Status: He is alert and oriented to person, place, and time.   Psychiatric:         Mood and Affect: Mood normal.              Results for orders placed or performed during the hospital encounter of 08/20/24 (from the past 24 hour(s))   [place] Draw    Narrative    The following orders were created for panel order [place] [facility].  Procedure                               Abnormality         Status                     ---------                               -----------         ------                     Extra Blue Top Tube[[id number]]                              Final result               Extra Red Top Tube[[id number]]                               Final result               Extra Green Top (Lithium...[[id number]]                      Final result               Extra Green Top (Lithium...[[id number]]                      Final result               Extra Purple Top Tube[[id number]]                            Final result                 Please view results for these tests on the " individual orders.   Extra Blue Top Tube   Result Value Ref Range    Hold Specimen JIC    Extra Red Top Tube   Result Value Ref Range    Hold Specimen hold    Extra Green Top (Lithium Heparin) Tube   Result Value Ref Range    Hold Specimen JIC    Extra Green Top (Lithium Heparin) Tube   Result Value Ref Range    Hold Specimen JIC    Extra Purple Top Tube   Result Value Ref Range    Hold Specimen JIC    CBC with platelets differential    Narrative    The following orders were created for panel order CBC with platelets differential.  Procedure                               Abnormality         Status                     ---------                               -----------         ------                     CBC with platelets and d...[[id number]]  Abnormal            Final result                 Please view results for these tests on the individual orders.   Comprehensive metabolic panel   Result Value Ref Range    Sodium 136 135 - 145 mmol/L    Potassium 3.6 3.4 - 5.3 mmol/L    Carbon Dioxide (CO2) 26 22 - 29 mmol/L    Anion Gap 10 7 - 15 mmol/L    Urea Nitrogen 5.3 (L) 6.0 - 20.0 mg/dL    Creatinine 0.70 0.67 - 1.17 mg/dL    GFR Estimate >90 >60 mL/min/1.73m2    Calcium 8.6 (L) 8.8 - 10.4 mg/dL    Chloride 100 98 - 107 mmol/L    Glucose 112 (H) 70 - 99 mg/dL    Alkaline Phosphatase 194 (H) 40 - 150 U/L    AST 27 0 - 45 U/L    ALT 25 0 - 70 U/L    Protein Total 6.1 (L) 6.4 - 8.3 g/dL    Albumin 3.6 3.5 - 5.2 g/dL    Bilirubin Total 0.3 <=1.2 mg/dL   Lactic acid whole blood with 1x repeat in 2 hr when >2   Result Value Ref Range    Lactic Acid, Initial 1.5 0.7 - 2.0 mmol/L   Procalcitonin   Result Value Ref Range    Procalcitonin 0.05 <0.50 ng/mL   CRP inflammation   Result Value Ref Range    CRP Inflammation 5.51 (H) <5.00 mg/L   Magnesium   Result Value Ref Range    Magnesium 1.5 (L) 1.7 - 2.3 mg/dL   CBC with platelets and differential   Result Value Ref Range    WBC Count 7.5 4.0 - 11.0 10e3/uL    RBC Count 4.24 (L)  4.40 - 5.90 10e6/uL    Hemoglobin 12.5 (L) 13.3 - 17.7 g/dL    Hematocrit 38.0 (L) 40.0 - 53.0 %    MCV 90 78 - 100 fL    MCH 29.5 26.5 - 33.0 pg    MCHC 32.9 31.5 - 36.5 g/dL    RDW 14.4 10.0 - 15.0 %    Platelet Count 221 150 - 450 10e3/uL    % Neutrophils 35 %    % Lymphocytes 37 %    % Monocytes 6 %    % Eosinophils 21 %    % Basophils 0 %    % Immature Granulocytes 0 %    NRBCs per 100 WBC 0 <1 /100    Absolute Neutrophils 2.6 1.6 - 8.3 10e3/uL    Absolute Lymphocytes 2.8 0.8 - 5.3 10e3/uL    Absolute Monocytes 0.5 0.0 - 1.3 10e3/uL    Absolute Eosinophils 1.6 (H) 0.0 - 0.7 10e3/uL    Absolute Basophils 0.0 0.0 - 0.2 10e3/uL    Absolute Immature Granulocytes 0.0 <=0.4 10e3/uL    Absolute NRBCs 0.0 10e3/uL   Lipase   Result Value Ref Range    Lipase 7 (L) 13 - 60 U/L   Hepatic function panel   Result Value Ref Range    Protein Total 6.1 (L) 6.4 - 8.3 g/dL    Albumin 3.6 3.5 - 5.2 g/dL    Bilirubin Total 0.3 <=1.2 mg/dL    Alkaline Phosphatase 194 (H) 40 - 150 U/L    AST 27 0 - 45 U/L    ALT 25 0 - 70 U/L    Bilirubin Direct <0.20 0.00 - 0.30 mg/dL   UA with Microscopic reflex to Culture    Specimen: Urine, Midstream   Result Value Ref Range    Color Urine Light Yellow Colorless, Straw, Light Yellow, Yellow    Appearance Urine Clear Clear    Glucose Urine Negative Negative mg/dL    Bilirubin Urine Negative Negative    Ketones Urine Negative Negative mg/dL    Specific Gravity Urine 1.006 1.000 - 1.030    Blood Urine Negative Negative    pH Urine 6.0 5.0 - 9.0    Protein Albumin Urine Negative Negative mg/dL    Urobilinogen Urine Normal Normal, 2.0 mg/dL    Nitrite Urine Negative Negative    Leukocyte Esterase Urine Negative Negative    RBC Urine 0 <=2 /HPF    WBC Urine <1 <=5 /HPF    Narrative    Urine Culture not indicated   C. difficile Toxin B PCR with reflex to C. difficile Antigen and Toxins A/B EIA    Specimen: Per Rectum; Stool   Result Value Ref Range    C Difficile Toxin B by PCR Negative Negative     Narrative    The [employer] Xpert C. difficile Assay, performed on the [employer]  [employer]ument Systems, is a qualitative in vitro diagnostic test for rapid detection of toxin B gene sequences from unformed (liquid or soft) stool specimens collected from patients suspected of having Clostridioides difficile infection (CDI). The test utilizes automated real-time polymerase chain reaction (PCR) to detect toxin gene sequences associated with toxin producing C. difficile. The Xpert C. difficile Assay is intended as an aid in the diagnosis of CDI.   CT Abdomen Pelvis w Contrast    Narrative    Exam:CT ABDOMEN PELVIS W CONTRAST    History: 44 years Male worsening abdominal pain. Diffuse tenderness. ?  abscess ? perforation bdominal pain and diarrhea, worse with eating.  He has had night sweats.        Comparisons: 8/18/2024    Technique: Axial CT imaging of the abdomen and pelvis was performed  with contrast. Coronal and sagittal reconstructions were obtained.   This exam was performed using one or more of the following dose  reduction techniques:  Automated exposure control, adjustment of the DONAL and/or KV according  to patient's size, and/or use of iterative reconstruction technique.       FINDINGS:  Lung bases: There is mild dependent atelectasis, unchanged from the  prior study.    Abdomen:  Liver:Unremarkable  Gallbladder and biliary tree: The gallbladder is distended. Question  presence of sludge. No calcified gallstones are seen. There is no  biliary dilatation. Appearance similar to the prior study.  Pancreas: There is atrophic change in the pancreas. Pancreatic  calcifications are present.  Spleen: There is a peripherally calcified splenic cyst or pseudocyst  unchanged from the prior study.  Adrenals:Normal    Kidneys and ureters:Unremarkable    Lymph nodes:There is no significant lymphadenopathy    Bowel: There is moderate wall thickening of the fundus and antrum of  the stomach similar to that seen  previously. No abnormally distended  or thickened loops of bowel are otherwise seen.    Appendix:Unremarkable    Vessels:Unremarkable    Osseous structures:Unremarkable    Pelvis:There is no evidence of mass or lymphadenopathy. No abnormal  fluid collections are present.            Impression    IMPRESSION: Similar findings to the prior study. There is mild to  moderate circumferential wall thickening of the fundus of the stomach  and antrum. Question gastritis. There is no evidence of bowel  obstruction. No evidence of free air or pneumatosis. No acute  intra-abdominal findings otherwise.    BRAYAN COLIN MD         SYSTEM ID:  [id number]       Medications   sodium chloride 0.9% BOLUS 1,000 mL (0 mLs Intravenous Stopped 8/20/24 1831)   ketorolac (TORADOL) injection 15 mg (15 mg Intravenous $Given 8/20/24 1719)   HYDROmorphone (PF) (DILAUDID) injection 0.5 mg (0.5 mg Intravenous $Given 8/20/24 1719)   magnesium sulfate 4 g in 100 mL sterile water intermittent infusion (0 g Intravenous Stopped 8/20/24 1937)   famotidine (PEPCID) injection 20 mg (20 mg Intravenous $Given 8/20/24 1756)   HYDROmorphone (PF) (DILAUDID) injection 0.5 mg (0.5 mg Intravenous $Given 8/20/24 2020)   sodium chloride 0.9 % bag 500 mL for CT scan flush use (60 mLs As instructed $Given 8/20/24 2028)   iopamidol (ISOVUE-370) solution 75 mL (75 mLs Intravenous $Given 8/20/24 2028)       Assessments & Plan (with Medical Decision Making)  Osvaldo Montague is a 44 year old male with complaints of abdominal pain and diarrhea.  He reports his abdominal pain is worsened over the past 48 hours.  If he eats anything the pain worsens.  He reports feeling dizzy and lightheaded today.  He reports fever and chills.  He wakes up drenched in the middle of the night and sweat.  He has been on and off antibiotics over the past month for dental pain.  He was seen 2 days ago in the ER see note below.  He reports frequent stools especially if he eats.  He  "describes them as liquid odorous yellow and oily.  He denies nausea.  He denies any recent travel.  Seen in the ED (8/18/24) see copied note below.   CT abdomen pelvis IV contrast today showed enteritis.   Stool cultures were ordered but patient unable to give a stool sample.  Will defer antibiotics until patient is able to give a stool sample.  A take-home stool sample kit was given to patient.  A small course of antibiotics and Zofran was given.  Recommend close up with primary care doctor.  If patient is able to give a stool culture, will call patient if patient comes back positive for C. Difficile abnormal stool results.   VS in the ED. BP (!) 155/107   Pulse 62   Temp 98.6  F (37  C) (Tympanic)   Resp 18   Ht 1.753 m (5' 9\")   Wt 59 kg (130 lb)   SpO2 96%   BMI 19.20 kg/m  Awake, alert, and in no acute distress. HR regular. LS clear. Abdomen soft guarding and diffuse tenderness on palpation. No palpable masses. Gave IVF bolus and dilaudid.   Diagnostics:    Lab: CBC- Hgb- 12.5 . Magnesium- 1.5. Lactic acid- normal. CRP- 5.51. Procalcitonin- 0.05. CMP- Alk phos- 194. Lipase- 7. UA- does not imply UTI. C-diff- negative.     CT scan:   CT abd/pelvis w/contrast- Similar findings to the prior study. There is mild to  moderate circumferential wall thickening of the fundus of the stomach  and antrum. Question gastritis. There is no evidence of bowel  obstruction. No evidence of free air or pneumatosis. No acute  intra-abdominal findings otherwise.     EKG  Rhythm- normal sinus rhythm   Rate- 71  Axis-normal.   Any abnormal   No significant changes when compared to previous EKG (8/18/24).   I have independently reviewed and interpreted today's EKG, pending cardiologist over read.    ED Course as of 08/20/24 2130 Tue Aug 20, 2024   1620 Magnesium(!): 1.5  Magnesium replacement ordered.    1838 C Difficile Toxin B by PCR: Negative   1839 Hemoglobin(!): 12.5  Down from 13.6 two days ago.    2012 Worsening " "abdominal pain. Diffuse tenderness. He is describing \"sharp shooting excruciating pain.\" Will repeat CT to rule out perforation or abscess.      2111 CT abdomen reveals similar findings to scan two days ago. Questionable gastritis.      Patient Vitals for the past 24 hrs:   BP Temp Temp src Pulse Resp SpO2 Height Weight   08/20/24 2059 (!) 155/107 -- -- 62 -- 96 % -- --   08/20/24 1959 (!) 150/102 -- -- 63 -- 97 % -- --   08/20/24 1900 (!) 147/107 -- -- 68 -- 95 % -- --   08/20/24 1804 (!) 147/100 -- -- -- -- 98 % -- --   08/20/24 1732 (!) 139/100 -- -- 74 -- 96 % -- --   08/20/24 1700 (!) 135/94 -- -- 74 -- -- -- --   08/20/24 1630 (!) 137/95 -- -- 76 -- -- -- --   08/20/24 1603 (!) 145/95 -- -- 71 -- -- -- --   08/20/24 1549 (!) 160/101 98.6  F (37  C) Tympanic 77 18 99 % 1.753 m (5' 9\") 59 kg (130 lb)     Osvaldo is a 45 y/o male evaluated today for abdominal pain and diarrhea concerns. Seen in the emergency room two days ago for abdominal pain. CT abdomen at that time revealed possible enteritis. Worsening abdominal and loose stools. Abdomen with diffuse tenderness. Labs are reassuring today. Minimal pain relief with IV dilaudid. Given diffuse tenderness and excruciating pain will repeat imaging. CT scan reveals similar findings seen on previous CT with questionable gastritis. Given no leukocytosis and he is afebrile will wait for enteric pathogen results. He reports running out of the percocet he was prescribed two days ago in the emergency room. Discussed follow up with primary care provider in the clinic for a recheck. I suspect a viral gastroenteritis. Will prescribe a short course of Pepcid and omeprazole. He has Zofran at home as needed for nausea. Return to ED precautions discussed.      I have reviewed the nursing notes.    I have reviewed the findings, diagnosis, plan and need for follow up with the patient.    Medical Decision Making  The patient's presentation was of moderate complexity (an acute " illness with systemic symptoms).    The patient's evaluation involved:  an assessment requiring an independent historian (see separate area of note for details)  review of external note(s) from 3+ sources (see separate area of note for details)  review of 3+ test result(s) ordered prior to this encounter (see separate area of note for details)  ordering and/or review of 3+ test(s) in this encounter (see separate area of note for details)    The patient's management necessitated moderate risk (prescription drug management including medications given in the ED), moderate risk (IV contrast administration), and high risk (a parenteral controlled substance).    New Prescriptions    FAMOTIDINE (PEPCID) 20 MG TABLET    Take 1 tablet (20 mg) by mouth 2 times daily for 14 days    OMEPRAZOLE (PRILOSEC) 20 MG DR CAPSULE    Take 1 capsule (20 mg) by mouth daily for 7 days     Final diagnoses:   Abdominal pain, unspecified abdominal location   Diarrhea, unspecified type     8/20/2024   [facility] AND hospitals       Arlet Perkins, APRN CNP  08/20/24 8716

## 2024-08-20 NOTE — ED TRIAGE NOTES
"Patient arrives today with c/o abdominal pain that has been ongoing for \"about a month\" but has worsened over the last week. Patient states that he was evaluated in our ER on Sunday but the symptoms have persisted. States he has had \"oily stools\" and frequent diarrhea with the last time being this morning. Patient does report dizziness, BLE edema and lightheadedness that has worsened over the last few days.      Triage Assessment (Adult)       Row Name 08/20/24 1541          Triage Assessment    Airway WDL WDL        Respiratory WDL    Respiratory WDL WDL        Skin Circulation/Temperature WDL    Skin Circulation/Temperature WDL WDL        Cardiac WDL    Cardiac WDL WDL        Peripheral/Neurovascular WDL    Peripheral Neurovascular WDL WDL        Cognitive/Neuro/Behavioral WDL    Cognitive/Neuro/Behavioral WDL WDL                     "

## 2024-08-20 NOTE — LETTER
August 26, 2024      Bam [facility]  [address]  [place] [address]        Dear ,    We are writing to inform you of your test results.    HIV and Hepatitis C results were negative     Resulted Orders   HIV Antigen Antibody Combo   Result Value Ref Range    HIV Antigen Antibody Combo Nonreactive Nonreactive      Comment:      Negative HIV-1 p24 antigen and HIV-1/2 antibody screening test results usually indicate the absence of HIV-1 and HIV-2 infection. However, such negative results do not rule-out acute HIV infection.  If acute HIV-1 or HIV-2 infection is suspected, detection of HIV-1 or HIV-2 RNA  is recommended.    Hepatitis C Screen Reflex to HCV RNA Quant and Genotype   Result Value Ref Range    Hepatitis C Antibody Nonreactive Nonreactive      Comment:      A nonreactive screening test result does not exclude the possibility of exposure to or infection with HCV. Nonreactive screening test results in individuals with prior exposure to HCV may be due to antibody levels below the limit of detection of this assay or lack of reactivity to the HCV antigens used in this assay. Patients with recent HCV infections (<3 months from time of exposure) may have false-negative HCV antibody results due to the time needed for seroconversion (average of 8 to 9 weeks).       If you have any questions or concerns, please call the clinic at the number listed above.       Sincerely,      Nani Mercado NP

## 2024-08-21 LAB
ADV 40+41 DNA STL QL NAA+NON-PROBE: NEGATIVE
ASTRO TYP 1-8 RNA STL QL NAA+NON-PROBE: NEGATIVE
ATRIAL RATE - MUSE: 71 BPM
C CAYETANENSIS DNA STL QL NAA+NON-PROBE: NEGATIVE
CAMPYLOBACTER DNA SPEC NAA+PROBE: NEGATIVE
CRYPTOSP DNA STL QL NAA+NON-PROBE: NEGATIVE
DIASTOLIC BLOOD PRESSURE - MUSE: NORMAL MMHG
E COLI O157 DNA STL QL NAA+NON-PROBE: NORMAL
E HISTOLYT DNA STL QL NAA+NON-PROBE: NEGATIVE
EAEC ASTA GENE ISLT QL NAA+PROBE: NEGATIVE
EC STX1+STX2 GENES STL QL NAA+NON-PROBE: NEGATIVE
EPEC EAE GENE STL QL NAA+NON-PROBE: NEGATIVE
ETEC LTA+ST1A+ST1B TOX ST NAA+NON-PROBE: NEGATIVE
G LAMBLIA DNA STL QL NAA+NON-PROBE: NEGATIVE
INTERPRETATION ECG - MUSE: NORMAL
NOROVIRUS GI+II RNA STL QL NAA+NON-PROBE: NEGATIVE
P AXIS - MUSE: 60 DEGREES
P SHIGELLOIDES DNA STL QL NAA+NON-PROBE: NEGATIVE
PR INTERVAL - MUSE: 156 MS
QRS DURATION - MUSE: 98 MS
QT - MUSE: 438 MS
QTC - MUSE: 475 MS
R AXIS - MUSE: 28 DEGREES
RVA RNA STL QL NAA+NON-PROBE: NEGATIVE
SALMONELLA SP RPOD STL QL NAA+PROBE: NEGATIVE
SAPO I+II+IV+V RNA STL QL NAA+NON-PROBE: NEGATIVE
SHIGELLA SP+EIEC IPAH ST NAA+NON-PROBE: NEGATIVE
SYSTOLIC BLOOD PRESSURE - MUSE: NORMAL MMHG
T AXIS - MUSE: 60 DEGREES
V CHOLERAE DNA SPEC QL NAA+PROBE: NEGATIVE
VENTRICULAR RATE- MUSE: 71 BPM
VIBRIO DNA SPEC NAA+PROBE: NEGATIVE
Y ENTEROCOL DNA STL QL NAA+PROBE: NEGATIVE

## 2024-08-21 NOTE — PROGRESS NOTES
.1.  Has the patient had a previous reaction to IV contrast? No    2.  Does the patient have kidney disease? No    3.  Is the patient on dialysis? No    If YES to any of these questions, exam will be reviewed with a Radiologist before administering contrast.  IV Contrast- Discharge Instructions After Your CT Scan      The IV contrast you received today will be filtered from your bloodstream by your kidneys during the next 24 hours and pass from the body in urine.  You will not be aware of this process and your urine will not change in color.  To help this process you should drink at least 4 additional glasses of water or juice today.  This reduces stress on your kidneys.    Most contrast reactions are immediate.  Should you develop symptoms of concern after discharge, contact the department at the number below.  After hours you should contact your personal physician.  If you develop breathing distress or wheezing, call 911.

## 2024-08-21 NOTE — DISCHARGE INSTRUCTIONS
Your labs are reassuring today. I suspect you have a viral illness causing you to have stomach pain. Start with a clear liquid diet for the next 12-24 hours and slowly introduce a bland diet. Avoid spicy, high fat and dairy products. You will receive a call from the [facility] regarding your stool sample.     Rest and stay hydrated.     You can try taking over the counter Pepcid twice a day and omeprazole once a day for one weeks.      Follow up in the clinic as scheduled.     Please return to the emergency room if you experience bloody stools, fever, vomiting, not able to keep any fluids down or any new or worsening concerns.

## 2024-08-23 ENCOUNTER — OFFICE VISIT (OUTPATIENT)
Dept: FAMILY MEDICINE | Facility: OTHER | Age: 44
End: 2024-08-23
Attending: NURSE PRACTITIONER

## 2024-08-23 VITALS
RESPIRATION RATE: 16 BRPM | OXYGEN SATURATION: 97 % | BODY MASS INDEX: 20.5 KG/M2 | SYSTOLIC BLOOD PRESSURE: 128 MMHG | DIASTOLIC BLOOD PRESSURE: 86 MMHG | WEIGHT: 138.8 LBS | HEART RATE: 83 BPM | TEMPERATURE: 97.5 F

## 2024-08-23 DIAGNOSIS — R74.8 ALKALINE PHOSPHATASE ELEVATION: ICD-10-CM

## 2024-08-23 DIAGNOSIS — Z11.4 SCREENING FOR HIV (HUMAN IMMUNODEFICIENCY VIRUS): ICD-10-CM

## 2024-08-23 DIAGNOSIS — R11.0 NAUSEA: ICD-10-CM

## 2024-08-23 DIAGNOSIS — R74.8 ELEVATED SERUM GGT LEVEL: ICD-10-CM

## 2024-08-23 DIAGNOSIS — Z11.59 NEED FOR HEPATITIS C SCREENING TEST: ICD-10-CM

## 2024-08-23 DIAGNOSIS — K29.50 CHRONIC GASTRITIS WITHOUT BLEEDING, UNSPECIFIED GASTRITIS TYPE: ICD-10-CM

## 2024-08-23 DIAGNOSIS — R10.84 ABDOMINAL PAIN, GENERALIZED: Primary | ICD-10-CM

## 2024-08-23 DIAGNOSIS — K82.8 GALLBLADDER SLUDGE: ICD-10-CM

## 2024-08-23 DIAGNOSIS — K82.8 GALLBLADDER DILATATION: ICD-10-CM

## 2024-08-23 DIAGNOSIS — R19.7 DIARRHEA, UNSPECIFIED TYPE: ICD-10-CM

## 2024-08-23 LAB
ALBUMIN SERPL BCG-MCNC: 3.6 G/DL (ref 3.5–5.2)
ALP SERPL-CCNC: 189 U/L (ref 40–150)
ALT SERPL W P-5'-P-CCNC: 20 U/L (ref 0–70)
ANION GAP SERPL CALCULATED.3IONS-SCNC: 9 MMOL/L (ref 7–15)
AST SERPL W P-5'-P-CCNC: 21 U/L (ref 0–45)
BASOPHILS # BLD AUTO: 0.1 10E3/UL (ref 0–0.2)
BASOPHILS NFR BLD AUTO: 1 %
BILIRUB SERPL-MCNC: 0.3 MG/DL
BUN SERPL-MCNC: 6.5 MG/DL (ref 6–20)
CALCIUM SERPL-MCNC: 8.7 MG/DL (ref 8.8–10.4)
CHLORIDE SERPL-SCNC: 101 MMOL/L (ref 98–107)
CREAT SERPL-MCNC: 0.75 MG/DL (ref 0.67–1.17)
CRP SERPL-MCNC: <3 MG/L
EGFRCR SERPLBLD CKD-EPI 2021: >90 ML/MIN/1.73M2
EOSINOPHIL # BLD AUTO: 1.5 10E3/UL (ref 0–0.7)
EOSINOPHIL NFR BLD AUTO: 17 %
ERYTHROCYTE [DISTWIDTH] IN BLOOD BY AUTOMATED COUNT: 13.9 % (ref 10–15)
ERYTHROCYTE [SEDIMENTATION RATE] IN BLOOD BY WESTERGREN METHOD: 2 MM/HR (ref 0–15)
GGT SERPL-CCNC: 156 U/L (ref 8–61)
GLUCOSE SERPL-MCNC: 121 MG/DL (ref 70–99)
HCO3 SERPL-SCNC: 27 MMOL/L (ref 22–29)
HCT VFR BLD AUTO: 39.9 % (ref 40–53)
HCV AB SERPL QL IA: NONREACTIVE
HGB BLD-MCNC: 13 G/DL (ref 13.3–17.7)
HIV 1+2 AB+HIV1 P24 AG SERPL QL IA: NONREACTIVE
IMM GRANULOCYTES # BLD: 0 10E3/UL
IMM GRANULOCYTES NFR BLD: 1 %
LDH SERPL L TO P-CCNC: 182 U/L (ref 0–250)
LIPASE SERPL-CCNC: 6 U/L (ref 13–60)
LYMPHOCYTES # BLD AUTO: 2.5 10E3/UL (ref 0.8–5.3)
LYMPHOCYTES NFR BLD AUTO: 29 %
MCH RBC QN AUTO: 29 PG (ref 26.5–33)
MCHC RBC AUTO-ENTMCNC: 32.6 G/DL (ref 31.5–36.5)
MCV RBC AUTO: 89 FL (ref 78–100)
MONOCYTES # BLD AUTO: 0.7 10E3/UL (ref 0–1.3)
MONOCYTES NFR BLD AUTO: 8 %
NEUTROPHILS # BLD AUTO: 4 10E3/UL (ref 1.6–8.3)
NEUTROPHILS NFR BLD AUTO: 45 %
NRBC # BLD AUTO: 0 10E3/UL
NRBC BLD AUTO-RTO: 0 /100
PLATELET # BLD AUTO: 227 10E3/UL (ref 150–450)
POTASSIUM SERPL-SCNC: 3.9 MMOL/L (ref 3.4–5.3)
PROT SERPL-MCNC: 6 G/DL (ref 6.4–8.3)
RBC # BLD AUTO: 4.49 10E6/UL (ref 4.4–5.9)
SODIUM SERPL-SCNC: 137 MMOL/L (ref 135–145)
WBC # BLD AUTO: 8.8 10E3/UL (ref 4–11)

## 2024-08-23 PROCEDURE — 82977 ASSAY OF GGT: CPT | Mod: ZL | Performed by: NURSE PRACTITIONER

## 2024-08-23 PROCEDURE — 83615 LACTATE (LD) (LDH) ENZYME: CPT | Mod: ZL | Performed by: NURSE PRACTITIONER

## 2024-08-23 PROCEDURE — 83690 ASSAY OF LIPASE: CPT | Mod: ZL | Performed by: NURSE PRACTITIONER

## 2024-08-23 PROCEDURE — 85652 RBC SED RATE AUTOMATED: CPT | Mod: ZL | Performed by: NURSE PRACTITIONER

## 2024-08-23 PROCEDURE — 80053 COMPREHEN METABOLIC PANEL: CPT | Mod: ZL | Performed by: NURSE PRACTITIONER

## 2024-08-23 PROCEDURE — 85025 COMPLETE CBC W/AUTO DIFF WBC: CPT | Mod: ZL | Performed by: NURSE PRACTITIONER

## 2024-08-23 PROCEDURE — 36415 COLL VENOUS BLD VENIPUNCTURE: CPT | Mod: ZL | Performed by: NURSE PRACTITIONER

## 2024-08-23 PROCEDURE — 86140 C-REACTIVE PROTEIN: CPT | Mod: ZL | Performed by: NURSE PRACTITIONER

## 2024-08-23 PROCEDURE — 99214 OFFICE O/P EST MOD 30 MIN: CPT | Performed by: NURSE PRACTITIONER

## 2024-08-23 RX ORDER — DULOXETIN HYDROCHLORIDE 60 MG/1
60 CAPSULE, DELAYED RELEASE ORAL
COMMUNITY
Start: 2024-05-17 | End: 2024-11-13

## 2024-08-23 RX ORDER — OXYCODONE AND ACETAMINOPHEN 5; 325 MG/1; MG/1
1 TABLET ORAL EVERY 6 HOURS PRN
Qty: 6 TABLET | Refills: 0 | Status: SHIPPED | OUTPATIENT
Start: 2024-08-23 | End: 2024-08-26

## 2024-08-23 RX ORDER — QUETIAPINE 300 MG/1
300 TABLET, FILM COATED, EXTENDED RELEASE ORAL
COMMUNITY
Start: 2024-05-17 | End: 2024-11-13

## 2024-08-23 RX ORDER — AMITRIPTYLINE HYDROCHLORIDE 10 MG/1
10 TABLET ORAL
COMMUNITY
Start: 2024-05-17 | End: 2024-11-13

## 2024-08-23 RX ORDER — SUCRALFATE 1 G/1
1 TABLET ORAL 4 TIMES DAILY
Qty: 112 TABLET | Refills: 0 | Status: SHIPPED | OUTPATIENT
Start: 2024-08-23 | End: 2024-09-20

## 2024-08-23 RX ORDER — OMEPRAZOLE 40 MG/1
40 CAPSULE, DELAYED RELEASE ORAL DAILY
Qty: 28 CAPSULE | Refills: 1 | Status: SHIPPED | OUTPATIENT
Start: 2024-08-23

## 2024-08-23 ASSESSMENT — PAIN SCALES - GENERAL: PAINLEVEL: WORST PAIN (10)

## 2024-08-23 NOTE — PATIENT INSTRUCTIONS
Recommend starting omeprazole daily and carafate 4 times daily. I have placed a referral for a general surgery consult/office visit. I have also placed orders for a colonoscopy and an upper endoscopy given your symptoms and frequent issues with abdominal pain. I have also placed an order for a gallbladder ultrasound.     Your lab work today was reassuring.     If you are getting worse for any reason; would recommend returning to the ER.

## 2024-08-23 NOTE — NURSING NOTE
"Chief Complaint   Patient presents with    ER F/U       Initial /86   Pulse 83   Temp 97.5  F (36.4  C) (Tympanic)   Resp 16   Wt 63 kg (138 lb 12.8 oz)   SpO2 97%   BMI 20.50 kg/m   Estimated body mass index is 20.5 kg/m  as calculated from the following:    Height as of 8/20/24: 1.753 m (5' 9\").    Weight as of this encounter: 63 kg (138 lb 12.8 oz).  Medication Review: complete    The next two questions are to help us understand your food security.  If you are feeling you need any assistance in this area, we have resources available to support you today.          8/23/2024   SDOH- Food Insecurity   Within the past 12 months, did you worry that your food would run out before you got money to buy more? Pt Declined   Within the past 12 months, did the food you bought just not last and you didn t have money to get more? Pt Declined            Health Care Directive:  Patient does not have a Health Care Directive or Living Will: Discussed advance care planning with patient; however, patient declined at this time.    Nani Brooks, YEIMI        "

## 2024-08-23 NOTE — PROGRESS NOTES
Assessment & Plan   Problem List Items Addressed This Visit    None  Visit Diagnoses       Abdominal pain, generalized    -  Primary    Relevant Medications    oxyCODONE-acetaminophen (PERCOCET) 5-325 MG tablet    Other Relevant Orders    Comprehensive Metabolic Panel (Completed)    CBC and Differential (Completed)    Lipase (Completed)    CRP inflammation (Completed)    Sedimentation Rate (ESR) (Completed)    Lactate Dehydrogenase (Completed)    Adult GI  Referral - Procedure Only    NM HepatOBiliary Scan    Adult Gen Surg  Referral    Diarrhea, unspecified type        Relevant Orders    Comprehensive Metabolic Panel (Completed)    CBC and Differential (Completed)    Lipase (Completed)    CRP inflammation (Completed)    Sedimentation Rate (ESR) (Completed)    Lactate Dehydrogenase (Completed)    NM HepatOBiliary Scan    Nausea        Relevant Orders    Comprehensive Metabolic Panel (Completed)    CBC and Differential (Completed)    Lipase (Completed)    CRP inflammation (Completed)    Sedimentation Rate (ESR) (Completed)    Lactate Dehydrogenase (Completed)    Gallbladder sludge        Relevant Orders    NM HepatOBiliary Scan    Gallbladder dilatation        Chronic gastritis without bleeding, unspecified gastritis type        Relevant Orders    Adult GI  Referral - Procedure Only    Adult Gen Surg  Referral    Alkaline phosphatase elevation        Relevant Orders    GAMMA GT (GGT)    Screening for HIV (human immunodeficiency virus)        Relevant Orders    HIV Antigen Antibody Combo    Need for hepatitis C screening test        Relevant Orders    Hepatitis C Screen Reflex to HCV RNA Quant and Genotype           1. Abdominal pain, generalized  2. Diarrhea, unspecified type  3. Nausea  This is a follow up from recent ED visits dated 8/18/2024 and 8/20/2024 for the above complaints. I have reviewed our records from [facility] on the above dates where abdominal and pelvic CT was  "performed. CT scan showed \"moderate circumferential wall thickening of the fundus of the stomach and antrum with questionable gastritis\" along with gallbladder sludge without gallstones or biliary dilatation. Bowel obstruction and other acute intra-abdominal findings were ruled out.  Bam reports his abdominal pain is persistent as are his loose stools particularly after eating. I have reviewed past medical records which reveal a visit with similar complaints to the ED in [place], [place] on 5/28/2024, where gallbladder dilatation was noted. He followed up with general surgeon Dr. Chinedu Acevedo MD on 5/29/2024 whose office visit note stated he did not feel his symptoms were of biliary etiology at that time. He had been treated with carafate and ppi and the symptoms did not improve. I have advised he take carafate and prilosec starting today again, and provided new prescriptions. Today's exam and history does suggest the possibility of potentially gallbladder etiology given symptoms including pain and oily stools occur particularly after eating but probable differential includes peptic ulcer disease and gastritis given location of pain and history. He denies any current alcohol use but has a history of pancreatitis and alcoholism in remission. Lab findings today are overall unrevealing, aside from a chronically low hemoglobin at 13. No acute blood loss as compared to recent values from 3 days ago and 5 days ago and a chronically mild elevated alkaline phosphatase. I have placed an order for a HIDA scan, upper and lower endoscopy, as well as a general surgery referral. If he develops bloody stools, vomiting blood, worsening abdominal pain or dizziness, new fevers, he was advised to return to the ED for urgent/emergent evaluation. I reluctantly provided an additional 6 tablets of pain medication upon patient's persistent request after reviewing the PDMP. I advised him this will not be refilled. He is hemodynamically " stable and also explained to him that he does not meet criteria for hospital admission at this time, which he also inquired about.    - Comprehensive Metabolic Panel  - CBC and Differential  - Lipase  - CRP inflammation  - Sedimentation Rate (ESR)  - Lactate Dehydrogenase  - Adult GI  Referral - Procedure Only; Future  - NM HepatOBiliary Scan  - Adult Gen Surg  Referral; Future    4. Gallbladder sludge  - NM HepatOBiliary Scan  Patient to follow up pending results.     5. Chronic gastritis without bleeding, unspecified gastritis type  - Adult GI  Referral - Procedure Only  - Adult Gen Surg  Referral  Long standing history of gastritis, epigastric pain. Recommend upper endoscopy and colonoscopy. I have placed a general surgery referral for evaluation of his gallbladder as well as a hepatobiliary scan order.     6. Alkaline phosphatase elevation  - GAMMA GT (GGT)  In process; it appears his alkaline phosphatase has been chronically elevated, but only mildly.     7. Screening for HIV (human immunodeficiency virus)  - HIV Antigen Antibody Combo; Future  In process    8. Need for hepatitis C screening test  - Hepatitis C Screen Reflex to HCV RNA Quant and Genotype; Future  In process       MED REC REQUIRED  Post Medication Reconciliation Status:     Nicotine/Tobacco Cessation  He reports that he has been smoking cigarettes. He has never used smokeless tobacco.  Nicotine/Tobacco Cessation Plan  Information offered: Patient not interested at this time    CONSULTATION/REFERRAL to general surgery to discuss ongoing abdominal pain. Colonoscopy/upper endoscopy as well as a hepatobiliary scan ordered.       No follow-ups on file.      Bridget Kuhn is a 44 year old, presenting for the following health issues:  ER F/U        8/23/2024     9:43 AM   Additional Questions   Roomed by YEIMI Cardoza   Accompanied by Self     HPI         ED/UC Followup:    Facility:  [facility]   Date of visit:  8/18/24 & 8/20/24  Reason for visit: Abdominal pain and diarrhea   Current Status: Has not changed- happens after eating so has decreased appetite as a result. When he eats, he reports yellow, oily stools almost immediately. If he does not eat, he only has 2-3 episodes of loose stools per day.    Bilateral feet and legs are getting mildly swollen in the evenings. He feels worse since being seen in the ER on 8/18 and 8/20. The pain is present in his bilateral upper abdomen and epigastric region. No family or friends are sick, lives alone. No recent travel. He is a current smoker. He denies any alcohol intake in the past 4 years. His symptoms started a couple months ago, intermittently occurring and worsening. He thinks he has lost 30 lbs in the last month, he states his normal weight is 165 lbs. No vomiting for past few days or nausea. Has been dizzy/lightheaded at times. Using zofran. Decreased appetite. Denies fevers. No bloody stools or emesis. He states he has had a colonoscopy before, in the past year in [place]. He tells me this result was limited due to some stool blocking visibility and that it was recommended he have a repeat colonoscopy in 3 years. He did not have an upper endoscopy. He also tells me he had seen a general surgeon regarding gallbladder, but ultimately it was felt this was not gallbladder related. Bam wants another opinion. He does not take nsaid medications on any regular basis.     He denies any prescription drug use (denies taking any opiates or benzodiazepines despite urine drug screen testing positive for these just 3 days ago). States meth use 20 years ago, but none since. He took the percocet in the past few days after being prescribed it from the hospital; which did not help his pain although he is persistent he wants more percocet for pain control.     [facility] - he had been treated with penicillin for dental infection 3 weeks ago or so.       Review of  Systems  Constitutional, HEENT, cardiovascular, pulmonary, GI, , musculoskeletal, neuro, skin, endocrine and psych systems are negative, except as otherwise noted.      Objective    /86   Pulse 83   Temp 97.5  F (36.4  C) (Tympanic)   Resp 16   Wt 63 kg (138 lb 12.8 oz)   SpO2 97%   BMI 20.50 kg/m    Body mass index is 20.5 kg/m .  Physical Exam  Vitals and nursing note reviewed.   Constitutional:       General: He is not in acute distress.     Appearance: He is not ill-appearing, toxic-appearing or diaphoretic.      Comments: Thin appearing    Cardiovascular:      Rate and Rhythm: Normal rate and regular rhythm.      Heart sounds: Normal heart sounds. No murmur heard.  Pulmonary:      Effort: Pulmonary effort is normal. No respiratory distress.      Breath sounds: Normal breath sounds. No stridor. No wheezing, rhonchi or rales.   Chest:      Chest wall: No tenderness.   Abdominal:      General: Abdomen is flat. Bowel sounds are normal. There is no distension.      Palpations: Abdomen is soft. There is no mass.      Tenderness: There is abdominal tenderness. There is guarding. There is no right CVA tenderness, left CVA tenderness or rebound.      Hernia: No hernia is present.      Comments: Bilateral upper abdominal tenderness on exam    Musculoskeletal:         General: Normal range of motion.   Skin:     General: Skin is warm and dry.      Coloration: Skin is pale. Skin is not jaundiced.      Findings: No rash.   Neurological:      General: No focal deficit present.      Mental Status: He is oriented to person, place, and time.   Psychiatric:         Mood and Affect: Mood normal.         Behavior: Behavior normal.               Results for orders placed or performed in visit on 08/23/24   Comprehensive Metabolic Panel     Status: Abnormal   Result Value Ref Range    Sodium 137 135 - 145 mmol/L    Potassium 3.9 3.4 - 5.3 mmol/L    Carbon Dioxide (CO2) 27 22 - 29 mmol/L    Anion Gap 9 7 - 15 mmol/L     Urea Nitrogen 6.5 6.0 - 20.0 mg/dL    Creatinine 0.75 0.67 - 1.17 mg/dL    GFR Estimate >90 >60 mL/min/1.73m2    Calcium 8.7 (L) 8.8 - 10.4 mg/dL    Chloride 101 98 - 107 mmol/L    Glucose 121 (H) 70 - 99 mg/dL    Alkaline Phosphatase 189 (H) 40 - 150 U/L    AST 21 0 - 45 U/L    ALT 20 0 - 70 U/L    Protein Total 6.0 (L) 6.4 - 8.3 g/dL    Albumin 3.6 3.5 - 5.2 g/dL    Bilirubin Total 0.3 <=1.2 mg/dL   Lipase     Status: Abnormal   Result Value Ref Range    Lipase 6 (L) 13 - 60 U/L   CRP inflammation     Status: Normal   Result Value Ref Range    CRP Inflammation <3.00 <5.00 mg/L   Sedimentation Rate (ESR)     Status: Normal   Result Value Ref Range    Erythrocyte Sedimentation Rate 2 0 - 15 mm/hr   CBC with platelets and differential     Status: Abnormal   Result Value Ref Range    WBC Count 8.8 4.0 - 11.0 10e3/uL    RBC Count 4.49 4.40 - 5.90 10e6/uL    Hemoglobin 13.0 (L) 13.3 - 17.7 g/dL    Hematocrit 39.9 (L) 40.0 - 53.0 %    MCV 89 78 - 100 fL    MCH 29.0 26.5 - 33.0 pg    MCHC 32.6 31.5 - 36.5 g/dL    RDW 13.9 10.0 - 15.0 %    Platelet Count 227 150 - 450 10e3/uL    % Neutrophils 45 %    % Lymphocytes 29 %    % Monocytes 8 %    % Eosinophils 17 %    % Basophils 1 %    % Immature Granulocytes 1 %    NRBCs per 100 WBC 0 <1 /100    Absolute Neutrophils 4.0 1.6 - 8.3 10e3/uL    Absolute Lymphocytes 2.5 0.8 - 5.3 10e3/uL    Absolute Monocytes 0.7 0.0 - 1.3 10e3/uL    Absolute Eosinophils 1.5 (H) 0.0 - 0.7 10e3/uL    Absolute Basophils 0.1 0.0 - 0.2 10e3/uL    Absolute Immature Granulocytes 0.0 <=0.4 10e3/uL    Absolute NRBCs 0.0 10e3/uL   Lactate Dehydrogenase     Status: Normal   Result Value Ref Range    Lactate Dehydrogenase 182 0 - 250 U/L   CBC and Differential     Status: Abnormal    Narrative    The following orders were created for panel order CBC and Differential.  Procedure                               Abnormality         Status                     ---------                               -----------          ------                     CBC with platelets and d...[[id number]]  Abnormal            Final result                 Please view results for these tests on the individual orders.         Signed Electronically by: Nani Mercado NP

## 2024-08-26 ENCOUNTER — TELEPHONE (OUTPATIENT)
Dept: SURGERY | Facility: OTHER | Age: 44
End: 2024-08-26

## 2024-08-26 NOTE — TELEPHONE ENCOUNTER
[facility] Diagnostic Referral    Patient has a referral for an EGD and C-Scope with a diagnosis of Abdominal pain, generalized  Chronic gastritis without bleeding, unspecified gastritis type.    Please advise.    Thank you,  Lizet James on 8/26/2024 at 7:52 AM

## 2024-08-27 ENCOUNTER — MEDICAL CORRESPONDENCE (OUTPATIENT)
Dept: HEALTH INFORMATION MANAGEMENT | Facility: OTHER | Age: 44
End: 2024-08-27

## 2024-09-10 ENCOUNTER — TELEPHONE (OUTPATIENT)
Dept: SURGERY | Facility: OTHER | Age: 44
End: 2024-09-10

## 2024-09-20 ENCOUNTER — TELEPHONE (OUTPATIENT)
Dept: SURGERY | Facility: OTHER | Age: 44
End: 2024-09-20
Payer: COMMERCIAL

## 2024-09-20 NOTE — TELEPHONE ENCOUNTER
Received call from  Dona. [phone]. She is the one that helps with getting things covered. Updated her and FA on this . Serena Anderson RN  ....................  9/20/2024   10:09 AM

## 2024-11-13 ENCOUNTER — HOSPITAL ENCOUNTER (OUTPATIENT)
Facility: OTHER | Age: 44
End: 2024-11-13
Attending: SURGERY | Admitting: SURGERY
Payer: COMMERCIAL

## 2024-11-13 DIAGNOSIS — Z12.11 ENCOUNTER FOR SCREENING COLONOSCOPY: Primary | ICD-10-CM

## 2024-11-13 NOTE — TELEPHONE ENCOUNTER
Screening Questions for the Scheduling of Screening Colonoscopies   (If Colonoscopy is diagnostic, Provider should review the chart before scheduling.)  Are you younger than 45 or older than 80?  NO  Do you take aspirin or fish oil?  NO (if yes, tell patient to stop 1 week prior to Colonoscopy)  Do you take warfarin (Coumadin), clopidogrel (Plavix), apixaban (Eliquis), dabigatram (Pradaxa), rivaroxaban (Xarelto) or any blood thinner? NO  Do you take semaglutide (Ozempic or Wegovy), tirzepatide (Mounjaro or Zepbound), liraglutide (Victoza), or dulaglutide (Trulicity)? NO  Do you use oxygen or a CPAP at home?  NO  Do you have kidney disease? NO  Are you on dialysis? NO  Have you had a stroke or heart attack in the last year? NO  Have you had a stent in your heart or any blood vessel in the last year? NO  Have you had a transplant of any organ? NO  Have you had a colonoscopy or upper endoscopy (EGD) before? YES   When? 05/06/2024  Date of scheduled EGD & Colonoscopy. 12/05/2024  Provider [facility]  Pharm[facility]

## 2024-11-18 RX ORDER — POLYETHYLENE GLYCOL 3350, SODIUM CHLORIDE, SODIUM BICARBONATE, POTASSIUM CHLORIDE 420; 11.2; 5.72; 1.48 G/4L; G/4L; G/4L; G/4L
4000 POWDER, FOR SOLUTION ORAL ONCE
Qty: 4000 ML | Refills: 0 | Status: SHIPPED | OUTPATIENT
Start: 2024-11-28 | End: 2024-11-28

## 2024-11-18 RX ORDER — BISACODYL 5 MG/1
TABLET, DELAYED RELEASE ORAL
Qty: 2 TABLET | Refills: 0 | Status: SHIPPED | OUTPATIENT
Start: 2024-11-28

## 2024-12-03 ENCOUNTER — MEDICAL CORRESPONDENCE (OUTPATIENT)
Dept: HEALTH INFORMATION MANAGEMENT | Facility: OTHER | Age: 44
End: 2024-12-03
Payer: COMMERCIAL

## 2024-12-19 ENCOUNTER — HOSPITAL ENCOUNTER (EMERGENCY)
Facility: OTHER | Age: 44
Discharge: HOME | End: 2024-12-19
Attending: FAMILY MEDICINE
Payer: COMMERCIAL

## 2024-12-19 ENCOUNTER — APPOINTMENT (OUTPATIENT)
Dept: CT IMAGING | Facility: OTHER | Age: 44
End: 2024-12-19
Attending: FAMILY MEDICINE
Payer: COMMERCIAL

## 2024-12-19 VITALS
RESPIRATION RATE: 18 BRPM | WEIGHT: 138 LBS | TEMPERATURE: 98.2 F | DIASTOLIC BLOOD PRESSURE: 83 MMHG | OXYGEN SATURATION: 97 % | HEART RATE: 101 BPM | HEIGHT: 69 IN | BODY MASS INDEX: 20.44 KG/M2 | SYSTOLIC BLOOD PRESSURE: 175 MMHG

## 2024-12-19 DIAGNOSIS — R11.10 VOMITING, UNSPECIFIED VOMITING TYPE, UNSPECIFIED WHETHER NAUSEA PRESENT: ICD-10-CM

## 2024-12-19 DIAGNOSIS — R10.13 EPIGASTRIC PAIN: ICD-10-CM

## 2024-12-19 LAB
ABO + RH BLD: NORMAL
ALBUMIN SERPL BCG-MCNC: 3.9 G/DL (ref 3.5–5.2)
ALP SERPL-CCNC: 129 U/L (ref 40–150)
ALT SERPL W P-5'-P-CCNC: 15 U/L (ref 0–70)
ANION GAP SERPL CALCULATED.3IONS-SCNC: 8 MMOL/L (ref 7–15)
AST SERPL W P-5'-P-CCNC: 20 U/L (ref 0–45)
BASOPHILS # BLD AUTO: 0.1 10E3/UL (ref 0–0.2)
BASOPHILS NFR BLD AUTO: 1 %
BILIRUB SERPL-MCNC: 0.3 MG/DL
BLD GP AB SCN SERPL QL: NEGATIVE
BUN SERPL-MCNC: 10.6 MG/DL (ref 6–20)
CALCIUM SERPL-MCNC: 8.7 MG/DL (ref 8.8–10.4)
CHLORIDE SERPL-SCNC: 105 MMOL/L (ref 98–107)
CREAT SERPL-MCNC: 0.83 MG/DL (ref 0.67–1.17)
EGFRCR SERPLBLD CKD-EPI 2021: >90 ML/MIN/1.73M2
EOSINOPHIL # BLD AUTO: 0.1 10E3/UL (ref 0–0.7)
EOSINOPHIL NFR BLD AUTO: 1 %
ERYTHROCYTE [DISTWIDTH] IN BLOOD BY AUTOMATED COUNT: 14.2 % (ref 10–15)
ETHANOL SERPL-MCNC: <0.01 G/DL
GLUCOSE SERPL-MCNC: 118 MG/DL (ref 70–99)
HCO3 SERPL-SCNC: 26 MMOL/L (ref 22–29)
HCT VFR BLD AUTO: 35.3 % (ref 40–53)
HGB BLD-MCNC: 11.8 G/DL (ref 13.3–17.7)
HOLD SPECIMEN: NORMAL
HOLD SPECIMEN: NORMAL
IMM GRANULOCYTES # BLD: 0 10E3/UL
IMM GRANULOCYTES NFR BLD: 0 %
INR PPP: 1.07 (ref 0.85–1.15)
LIPASE SERPL-CCNC: 6 U/L (ref 13–60)
LYMPHOCYTES # BLD AUTO: 1.5 10E3/UL (ref 0.8–5.3)
LYMPHOCYTES NFR BLD AUTO: 17 %
MCH RBC QN AUTO: 29.6 PG (ref 26.5–33)
MCHC RBC AUTO-ENTMCNC: 33.4 G/DL (ref 31.5–36.5)
MCV RBC AUTO: 89 FL (ref 78–100)
MONOCYTES # BLD AUTO: 0.4 10E3/UL (ref 0–1.3)
MONOCYTES NFR BLD AUTO: 5 %
NEUTROPHILS # BLD AUTO: 6.7 10E3/UL (ref 1.6–8.3)
NEUTROPHILS NFR BLD AUTO: 76 %
NRBC # BLD AUTO: 0 10E3/UL
NRBC BLD AUTO-RTO: 0 /100
PLATELET # BLD AUTO: 217 10E3/UL (ref 150–450)
POTASSIUM SERPL-SCNC: 4.2 MMOL/L (ref 3.4–5.3)
PROT SERPL-MCNC: 6.2 G/DL (ref 6.4–8.3)
RBC # BLD AUTO: 3.99 10E6/UL (ref 4.4–5.9)
SODIUM SERPL-SCNC: 139 MMOL/L (ref 135–145)
SPECIMEN EXP DATE BLD: NORMAL
WBC # BLD AUTO: 8.9 10E3/UL (ref 4–11)

## 2024-12-19 PROCEDURE — 99285 EMERGENCY DEPT VISIT HI MDM: CPT | Mod: 25 | Performed by: FAMILY MEDICINE

## 2024-12-19 PROCEDURE — 82077 ASSAY SPEC XCP UR&BREATH IA: CPT | Performed by: FAMILY MEDICINE

## 2024-12-19 PROCEDURE — 250N000011 HC RX IP 250 OP 636: Performed by: FAMILY MEDICINE

## 2024-12-19 PROCEDURE — 96375 TX/PRO/DX INJ NEW DRUG ADDON: CPT | Performed by: FAMILY MEDICINE

## 2024-12-19 PROCEDURE — 96374 THER/PROPH/DIAG INJ IV PUSH: CPT | Mod: XU | Performed by: FAMILY MEDICINE

## 2024-12-19 PROCEDURE — 85004 AUTOMATED DIFF WBC COUNT: CPT | Performed by: FAMILY MEDICINE

## 2024-12-19 PROCEDURE — 36415 COLL VENOUS BLD VENIPUNCTURE: CPT | Performed by: FAMILY MEDICINE

## 2024-12-19 PROCEDURE — 83690 ASSAY OF LIPASE: CPT | Performed by: FAMILY MEDICINE

## 2024-12-19 PROCEDURE — 86900 BLOOD TYPING SEROLOGIC ABO: CPT | Performed by: FAMILY MEDICINE

## 2024-12-19 PROCEDURE — 250N000013 HC RX MED GY IP 250 OP 250 PS 637: Performed by: FAMILY MEDICINE

## 2024-12-19 PROCEDURE — 250N000009 HC RX 250: Performed by: FAMILY MEDICINE

## 2024-12-19 PROCEDURE — 96376 TX/PRO/DX INJ SAME DRUG ADON: CPT | Performed by: FAMILY MEDICINE

## 2024-12-19 PROCEDURE — 74177 CT ABD & PELVIS W/CONTRAST: CPT

## 2024-12-19 PROCEDURE — 84155 ASSAY OF PROTEIN SERUM: CPT | Performed by: FAMILY MEDICINE

## 2024-12-19 PROCEDURE — 86850 RBC ANTIBODY SCREEN: CPT | Performed by: FAMILY MEDICINE

## 2024-12-19 PROCEDURE — 99284 EMERGENCY DEPT VISIT MOD MDM: CPT | Performed by: FAMILY MEDICINE

## 2024-12-19 PROCEDURE — 85014 HEMATOCRIT: CPT | Performed by: FAMILY MEDICINE

## 2024-12-19 PROCEDURE — 85610 PROTHROMBIN TIME: CPT | Performed by: FAMILY MEDICINE

## 2024-12-19 PROCEDURE — 84132 ASSAY OF SERUM POTASSIUM: CPT | Performed by: FAMILY MEDICINE

## 2024-12-19 RX ORDER — HYDROCODONE BITARTRATE AND ACETAMINOPHEN 5; 325 MG/1; MG/1
1 TABLET ORAL EVERY 6 HOURS PRN
Qty: 4 TABLET | Refills: 0 | Status: SHIPPED | OUTPATIENT
Start: 2024-12-19

## 2024-12-19 RX ORDER — HYDROMORPHONE HYDROCHLORIDE 1 MG/ML
0.5 INJECTION, SOLUTION INTRAMUSCULAR; INTRAVENOUS; SUBCUTANEOUS EVERY 30 MIN PRN
Status: DISCONTINUED | OUTPATIENT
Start: 2024-12-19 | End: 2024-12-19 | Stop reason: HOSPADM

## 2024-12-19 RX ORDER — ONDANSETRON 2 MG/ML
4 INJECTION INTRAMUSCULAR; INTRAVENOUS
Status: DISCONTINUED | OUTPATIENT
Start: 2024-12-19 | End: 2024-12-19 | Stop reason: HOSPADM

## 2024-12-19 RX ORDER — PANTOPRAZOLE SODIUM 20 MG/1
20 TABLET, DELAYED RELEASE ORAL DAILY
Qty: 15 TABLET | Refills: 0 | Status: SHIPPED | OUTPATIENT
Start: 2024-12-19 | End: 2025-01-03

## 2024-12-19 RX ORDER — IOPAMIDOL 755 MG/ML
80 INJECTION, SOLUTION INTRAVASCULAR ONCE
Status: COMPLETED | OUTPATIENT
Start: 2024-12-19 | End: 2024-12-19

## 2024-12-19 RX ORDER — SUCRALFATE 1 G/1
1 TABLET ORAL ONCE
Status: COMPLETED | OUTPATIENT
Start: 2024-12-19 | End: 2024-12-19

## 2024-12-19 RX ORDER — FAMOTIDINE 20 MG/1
20 TABLET, FILM COATED ORAL 2 TIMES DAILY
Qty: 30 TABLET | Refills: 0 | Status: SHIPPED | OUTPATIENT
Start: 2024-12-19 | End: 2025-01-03

## 2024-12-19 RX ADMIN — PANTOPRAZOLE SODIUM 40 MG: 40 INJECTION, POWDER, FOR SOLUTION INTRAVENOUS at 11:30

## 2024-12-19 RX ADMIN — SUCRALFATE 1 G: 1 TABLET ORAL at 11:30

## 2024-12-19 RX ADMIN — IOPAMIDOL 80 ML: 755 INJECTION, SOLUTION INTRAVENOUS at 14:08

## 2024-12-19 RX ADMIN — SODIUM CHLORIDE 60 ML: 9 INJECTION, SOLUTION INTRAVENOUS at 14:08

## 2024-12-19 RX ADMIN — FAMOTIDINE 20 MG: 10 INJECTION, SOLUTION INTRAVENOUS at 11:29

## 2024-12-19 RX ADMIN — ONDANSETRON 4 MG: 2 INJECTION INTRAMUSCULAR; INTRAVENOUS at 11:29

## 2024-12-19 RX ADMIN — HYDROMORPHONE HYDROCHLORIDE 0.5 MG: 1 INJECTION, SOLUTION INTRAMUSCULAR; INTRAVENOUS; SUBCUTANEOUS at 12:25

## 2024-12-19 RX ADMIN — HYDROMORPHONE HYDROCHLORIDE 0.5 MG: 1 INJECTION, SOLUTION INTRAMUSCULAR; INTRAVENOUS; SUBCUTANEOUS at 11:29

## 2024-12-19 ASSESSMENT — ENCOUNTER SYMPTOMS
ABDOMINAL PAIN: 1
VOMITING: 1
BLOOD IN STOOL: 1
NAUSEA: 1

## 2024-12-19 ASSESSMENT — ACTIVITIES OF DAILY LIVING (ADL)
ADLS_ACUITY_SCORE: 41

## 2024-12-19 ASSESSMENT — COLUMBIA-SUICIDE SEVERITY RATING SCALE - C-SSRS
2. HAVE YOU ACTUALLY HAD ANY THOUGHTS OF KILLING YOURSELF IN THE PAST MONTH?: NO
6. HAVE YOU EVER DONE ANYTHING, STARTED TO DO ANYTHING, OR PREPARED TO DO ANYTHING TO END YOUR LIFE?: NO
1. IN THE PAST MONTH, HAVE YOU WISHED YOU WERE DEAD OR WISHED YOU COULD GO TO SLEEP AND NOT WAKE UP?: NO

## 2024-12-19 NOTE — ED PROVIDER NOTES
"  History     Chief Complaint   Patient presents with    Melena    Hematemesis    Abdominal Pain     The history is provided by the patient and medical records.     Bam Orlando is a 44 year old male here with stomach pain 9 of 10 of 10, vomiting and dark stool that \"looks like tar.\"   He has more pain with eating. He states that he feels lightheaded and dizzy when he is up and around. This has been going on for about a week now. No fall or trauma to cause this. He no longer drinks alcohol.     No history of this recently.  He is a  and lives in his camper, so he is here for care now and wants follow up at [facility] if needed.  He had an EGD and colonoscopy in [place] about 20 years ago, back when he was drinking a lot.  He thinks they were both okay.  Hs is not on blood thinners.     Allergies:  Allergies   Allergen Reactions    Tramadol Other (See Comments)     Seizure        Problem List:    There are no active problems to display for this patient.       Past Medical History:    History reviewed. No pertinent past medical history.    Past Surgical History:    History reviewed. No pertinent surgical history.    Family History:    History reviewed. No pertinent family history.    Social History:  Marital Status:  Single [1]  Social History     Tobacco Use    Smoking status: Every Day     Current packs/day: 1.00     Types: Cigarettes    Smokeless tobacco: Never   Vaping Use    Vaping status: Every Day    Substances: Nicotine   Substance Use Topics    Alcohol use: Not Currently    Drug use: Not Currently     Types: Methamphetamines     Comment: per 8/23/24 OV note        Medications:    bisacodyl (DULCOLAX) 5 MG EC tablet  DULoxetine (CYMBALTA) 60 MG capsule  famotidine (PEPCID) 20 MG tablet  HYDROcodone-acetaminophen (NORCO) 5-325 MG tablet  omeprazole (PRILOSEC) 40 MG DR capsule  ondansetron (ZOFRAN ODT) 4 MG ODT tab  oxyCODONE-acetaminophen (PERCOCET) 5-325 MG tablet  pantoprazole (PROTONIX) 20 MG EC " "tablet  QUEtiapine ER (SEROQUEL XR) 300 MG 24 hr tablet          Review of Systems   Gastrointestinal:  Positive for abdominal pain, blood in stool, nausea and vomiting.   All other systems reviewed and are negative.      Physical Exam   BP: (!) 175/83  Pulse: 101  Temp: 98.2  F (36.8  C)  Resp: 18  Height: 175.3 cm (5' 9\")  Weight: 62.6 kg (138 lb)  SpO2: 99 %      Physical Exam  Vitals and nursing note reviewed.   Constitutional:       General: He is not in acute distress.     Appearance: He is well-developed. He is not ill-appearing.   Cardiovascular:      Rate and Rhythm: Normal rate and regular rhythm.      Heart sounds: Normal heart sounds. No murmur heard.  Abdominal:      General: Abdomen is flat. Bowel sounds are normal.      Palpations: Abdomen is soft.      Tenderness: There is generalized abdominal tenderness.   Genitourinary:     Comments: He has no dark stool or bright red blood per rectum  Skin:     General: Skin is warm and dry.   Neurological:      General: No focal deficit present.      Mental Status: He is alert and oriented to person, place, and time.         Results for orders placed or performed during the hospital encounter of 12/19/24 (from the past 24 hours)   CBC with platelets differential    Narrative    The following orders were created for panel order CBC with platelets differential.  Procedure                               Abnormality         Status                     ---------                               -----------         ------                     CBC with platelets and d...[[id number]]  Abnormal            Final result                 Please view results for these tests on the individual orders.   Comprehensive metabolic panel   Result Value Ref Range    Sodium 139 135 - 145 mmol/L    Potassium 4.2 3.4 - 5.3 mmol/L    Carbon Dioxide (CO2) 26 22 - 29 mmol/L    Anion Gap 8 7 - 15 mmol/L    Urea Nitrogen 10.6 6.0 - 20.0 mg/dL    Creatinine 0.83 0.67 - 1.17 mg/dL    GFR Estimate >90 " >60 mL/min/1.73m2    Calcium 8.7 (L) 8.8 - 10.4 mg/dL    Chloride 105 98 - 107 mmol/L    Glucose 118 (H) 70 - 99 mg/dL    Alkaline Phosphatase 129 40 - 150 U/L    AST 20 0 - 45 U/L    ALT 15 0 - 70 U/L    Protein Total 6.2 (L) 6.4 - 8.3 g/dL    Albumin 3.9 3.5 - 5.2 g/dL    Bilirubin Total 0.3 <=1.2 mg/dL   Lipase   Result Value Ref Range    Lipase 6 (L) 13 - 60 U/L   INR   Result Value Ref Range    INR 1.07 0.85 - 1.15   Ethanol GH   Result Value Ref Range    Alcohol ethyl <0.01 <=0.01 g/dL   ABO/Rh type and screen *Canceled*    Narrative    The following orders were created for panel order ABO/Rh type and screen.  Procedure                               Abnormality         Status                     ---------                               -----------         ------                     Adult Type and Screen[[id number]]                                                         Please view results for these tests on the individual orders.   [place] Draw    Narrative    The following orders were created for panel order [place] [facility].  Procedure                               Abnormality         Status                     ---------                               -----------         ------                     Extra Blue Top Tube[[id number]]                                                         Extra Red Top Tube[[id number]]                               Final result               Extra Green Top (Lithium...[[id number]]                      Final result               Extra Purple Top Tube[[id number]]                                                       Extra Green Top (Lithium...[[id number]]                                                   Please view results for these tests on the individual orders.   CBC with platelets and differential   Result Value Ref Range    WBC Count 8.9 4.0 - 11.0 10e3/uL    RBC Count 3.99 (L) 4.40 - 5.90 10e6/uL    Hemoglobin 11.8 (L) 13.3 - 17.7 g/dL    Hematocrit 35.3 (L) 40.0 - 53.0 %    MCV 89 78  "- 100 fL    MCH 29.6 26.5 - 33.0 pg    MCHC 33.4 31.5 - 36.5 g/dL    RDW 14.2 10.0 - 15.0 %    Platelet Count 217 150 - 450 10e3/uL    % Neutrophils 76 %    % Lymphocytes 17 %    % Monocytes 5 %    % Eosinophils 1 %    % Basophils 1 %    % Immature Granulocytes 0 %    NRBCs per 100 WBC 0 <1 /100    Absolute Neutrophils 6.7 1.6 - 8.3 10e3/uL    Absolute Lymphocytes 1.5 0.8 - 5.3 10e3/uL    Absolute Monocytes 0.4 0.0 - 1.3 10e3/uL    Absolute Eosinophils 0.1 0.0 - 0.7 10e3/uL    Absolute Basophils 0.1 0.0 - 0.2 10e3/uL    Absolute Immature Granulocytes 0.0 <=0.4 10e3/uL    Absolute NRBCs 0.0 10e3/uL   Extra Red Top Tube   Result Value Ref Range    Hold Specimen JIC    Extra Green Top (Lithium Heparin) Tube   Result Value Ref Range    Hold Specimen JIC    ABO/Rh type and screen    Narrative    The following orders were created for panel order ABO/Rh type and screen.  Procedure                               Abnormality         Status                     ---------                               -----------         ------                     Adult Type and Screen[[id number]]                            Edited Result - FINAL        Please view results for these tests on the individual orders.   Adult Type and Screen   Result Value Ref Range    ABO/RH(D) B POS     Antibody Screen Negative Negative    SPECIMEN EXPIRATION DATE [id number]    CT Abdomen Pelvis w Contrast    Narrative    Exam:CT ABDOMEN PELVIS W CONTRAST    History: 44 years Male with abdominal pain here with stomach pain 9 of  10 of 10, vomiting and dark stool that \"looks like tar.\" He has more  pain with eating. He states that he feels lightheaded and dizzy when  he is up and around. This has been going on for about a week now. No  fall or trauma to cause this. He no longer drinks alcohol.     Comparisons: 8/20/2024    Technique: Axial CT imaging of the abdomen and pelvis was performed  with contrast. Coronal and sagittal reconstructions were obtained.   This " exam was performed using one or more of the following dose  reduction techniques:  Automated exposure control, adjustment of the DONAL and/or KV according  to patient's size, and/or use of iterative reconstruction technique.       FINDINGS:  Lung bases:The lung bases are clear    Abdomen:  Liver:Unremarkable  Gallbladder and biliary tree:No calcified gallstones are present.  There is no evidence of biliary dilatation.  Pancreas: There is atrophic change. There is extensive pancreatic  calcification. Appearance is similar to the prior study.  Spleen: There is a splenic cyst with peripheral calcification  measuring 3.8 cm. This is unchanged from the prior study.  Adrenals:Normal    Kidneys and ureters:Unremarkable    Lymph nodes:There is no significant lymphadenopathy    Bowel: Subtle wall thickening of the antrum the stomach is present.  This is less prominent than that seen previously.    No abnormally distended or thickened bowel are otherwise seen. No  evidence of free air.    Appendix:Unremarkable    Vessels:Unremarkable    Osseous structures:Unremarkable      Pelvis:There is no evidence of mass or lymphadenopathy. No abnormal  fluid collections are present.            Impression    IMPRESSION: Subtle circumferential wall thickening of the antrum the  stomach, possibly sequela of gastritis, gastric ulcer etc. Similar  findings are present previously but severity is less on today's exam.    No acute interval findings otherwise.    Chronic atrophic change of the pancreas with calcification. Question  history of chronic pancreatitis    BRAYAN COLIN MD         SYSTEM ID:  [id number]       Medications   famotidine (PEPCID) injection 20 mg (20 mg Intravenous $Given 12/19/24 1129)   HYDROmorphone (PF) (DILAUDID) injection 0.5 mg (0.5 mg Intravenous $Given 12/19/24 1225)   ondansetron (ZOFRAN) injection 4 mg (4 mg Intravenous $Given 12/19/24 1129)   sucralfate (CARAFATE) tablet 1 g (1 g Oral $Given 12/19/24 1130)  "  pantoprazole (PROTONIX) IV push injection 40 mg (40 mg Intravenous $Given 12/19/24 1130)   iopamidol (ISOVUE-370) solution 80 mL (80 mLs Intravenous $Given 12/19/24 1408)   sodium chloride 0.9 % bag 500 mL for CT scan flush use (60 mLs Intravenous $Given 12/19/24 1408)       Assessments & Plan (with Medical Decision Making)  Bam Orlando is a 44 year old male here with stomach pain 9 of 10 of 10, vomiting and dark stool that \"looks like tar.\"   He has more pain with eating. He states that he feels lightheaded and dizzy when he is up and around. This has been going on for about a week now. No fall or trauma to cause this. He no longer drinks alcohol.   No history of this recently.  He is a  and lives in his camper, so he is here for care now and wants follow up at [facility] if needed.  He had an EGD and colonoscopy in [place] about 20 years ago, back when he was drinking a lot.  He thinks they were both okay.  Hs is not on blood thinners.   VS in the ED BP (!) 175/83   Pulse 101   Temp 98.2  F (36.8  C) (Temporal)   Resp 18   Ht 1.753 m (5' 9\")   Wt 62.6 kg (138 lb)   SpO2 97%   BMI 20.38 kg/m    Exam shows generalized abdominal pain.   We gave Pepcid, pantoprazole, Dilaudid, Carafate, Zofran  Labs show CBC with hgb 11.8, CMP okay, lipase okay, INR normal, ethanol zero  CT abdomen and pelvis shows subtle circumferential wall thickening of the antrum the stomach, possibly sequela of gastritis, gastric ulcer etc. Similar findings are present previously but severity is less on today's exam.  Chronic pancreatitis noted.  We will get him home on PPI, H2 blocker, and four Vicodin. I did ask for an EGD, procedure only, referral with general surgery.       I have reviewed the nursing notes.    I have reviewed the findings, diagnosis, plan and need for follow up with the patient.  Medical Decision Making  The patient's presentation was of moderate complexity (an acute illness with systemic symptoms).    The " patient's evaluation involved:  an assessment requiring an independent historian (see separate area of note for details)  ordering and/or review of 3+ test(s) in this encounter (see separate area of note for details)    The patient's management necessitated moderate risk (prescription drug management including medications given in the ED).        New Prescriptions    FAMOTIDINE (PEPCID) 20 MG TABLET    Take 1 tablet (20 mg) by mouth 2 times daily for 15 days.    HYDROCODONE-ACETAMINOPHEN (NORCO) 5-325 MG TABLET    Take 1 tablet by mouth every 6 hours as needed for severe pain. Do not drive or operate machinery for six hours after taking this medicine. This medicine will make you more likely to fall so please be careful.  Do not use firearms for six hours after taking this medicine.    PANTOPRAZOLE (PROTONIX) 20 MG EC TABLET    Take 1 tablet (20 mg) by mouth daily for 15 days.       Final diagnoses:   Epigastric pain   Vomiting, unspecified vomiting type, unspecified whether nausea present       12/19/2024   [facility] AND [facility], Franki Royal MD  12/19/24 1424

## 2024-12-19 NOTE — DISCHARGE INSTRUCTIONS
Bam    I will get you home on pantoprazole daily for 15 days, Pepcid twice a day for 15 days and Vicodin for pain.    Regarding Vicodin: Do not drive or operate machinery for six hours after taking this medicine. This medicine will make you more likely to fall so please be careful.  Do not use firearms for six hours after taking this medicine.     Thank you for choosing our Emergency Department for your care.     You may receive a phone call or letter for a survey about your care in our ED.  Please complete this as this is how we improve care for our patients.     If you have any questions after leaving the ED you can call or text me on my cell phone at [phone].  I am not on call so if I do not answer my phone please leave a message- I will get back to you.  If you are not doing well please return to the ED.     Sincerely,    Dr Andrzej Gonsalez M.D.  Medical Director  [facility] Emergency Department

## 2024-12-19 NOTE — ED TRIAGE NOTES
Patient reports dark coffee ground emesis and tarry stool for 1 week. He also has 10/10 abdominal pain. He denies alcohol use. Hx of pancreatitis 20 years ago when he use to drink. Also states that he gets dizzy and light headed.       Triage Assessment (Adult)       Row Name 12/19/24 1050          Triage Assessment    Airway WDL WDL        Respiratory WDL    Respiratory WDL WDL        Skin Circulation/Temperature WDL    Skin Circulation/Temperature WDL WDL        Cardiac WDL    Cardiac WDL X;rhythm     Pulse Rate & Regularity tachycardic        Peripheral/Neurovascular WDL    Peripheral Neurovascular WDL WDL        Cognitive/Neuro/Behavioral WDL    Cognitive/Neuro/Behavioral WDL WDL

## 2024-12-29 ENCOUNTER — APPOINTMENT (OUTPATIENT)
Dept: GENERAL RADIOLOGY | Facility: OTHER | Age: 44
End: 2024-12-29
Attending: PHYSICIAN ASSISTANT
Payer: COMMERCIAL

## 2024-12-29 ENCOUNTER — APPOINTMENT (OUTPATIENT)
Dept: CT IMAGING | Facility: OTHER | Age: 44
End: 2024-12-29
Attending: PHYSICIAN ASSISTANT
Payer: COMMERCIAL

## 2024-12-29 ENCOUNTER — HOSPITAL ENCOUNTER (OUTPATIENT)
Facility: OTHER | Age: 44
Setting detail: OBSERVATION
Discharge: HOME | End: 2024-12-30
Attending: PHYSICIAN ASSISTANT
Payer: COMMERCIAL

## 2024-12-29 DIAGNOSIS — D72.819 LEUKOPENIA, UNSPECIFIED TYPE: ICD-10-CM

## 2024-12-29 DIAGNOSIS — R10.84 GENERALIZED ABDOMINAL PAIN: ICD-10-CM

## 2024-12-29 DIAGNOSIS — K86.1 CHRONIC PANCREATITIS, UNSPECIFIED PANCREATITIS TYPE (H): ICD-10-CM

## 2024-12-29 PROBLEM — I16.0 HYPERTENSIVE URGENCY: Status: ACTIVE | Noted: 2024-12-29

## 2024-12-29 LAB
ALBUMIN SERPL BCG-MCNC: 4.2 G/DL (ref 3.5–5.2)
ALBUMIN UR-MCNC: NEGATIVE MG/DL
ALLEN'S TEST: NO
ALP SERPL-CCNC: 150 U/L (ref 40–150)
ALT SERPL W P-5'-P-CCNC: 17 U/L (ref 0–70)
AMPHETAMINES UR QL SCN: NORMAL
ANION GAP SERPL CALCULATED.3IONS-SCNC: 15 MMOL/L (ref 7–15)
APPEARANCE UR: CLEAR
AST SERPL W P-5'-P-CCNC: 24 U/L (ref 0–45)
BARBITURATES UR QL SCN: NORMAL
BASE EXCESS BLDA CALC-SCNC: -2.6 MMOL/L (ref -3–3)
BASOPHILS # BLD AUTO: 0.1 10E3/UL (ref 0–0.2)
BASOPHILS NFR BLD AUTO: 2 %
BENZODIAZ UR QL SCN: NORMAL
BILIRUB SERPL-MCNC: 0.3 MG/DL
BILIRUB UR QL STRIP: NEGATIVE
BUN SERPL-MCNC: 7.3 MG/DL (ref 6–20)
BZE UR QL SCN: NORMAL
CALCIUM SERPL-MCNC: 9.1 MG/DL (ref 8.8–10.4)
CANNABINOIDS UR QL SCN: NORMAL
CHLORIDE SERPL-SCNC: 103 MMOL/L (ref 98–107)
COLOR UR AUTO: ABNORMAL
CREAT SERPL-MCNC: 0.9 MG/DL (ref 0.67–1.17)
CRP SERPL-MCNC: <3 MG/L
D DIMER PPP FEU-MCNC: <=0.27 UG/ML FEU (ref 0–0.5)
EGFRCR SERPLBLD CKD-EPI 2021: >90 ML/MIN/1.73M2
EOSINOPHIL # BLD AUTO: 0.1 10E3/UL (ref 0–0.7)
EOSINOPHIL NFR BLD AUTO: 1 %
ERYTHROCYTE [DISTWIDTH] IN BLOOD BY AUTOMATED COUNT: 15 % (ref 10–15)
ETHANOL SERPL-MCNC: <0.01 G/DL
FENTANYL UR QL: NORMAL
FLUAV RNA SPEC QL NAA+PROBE: NEGATIVE
FLUBV RNA RESP QL NAA+PROBE: NEGATIVE
GLUCOSE SERPL-MCNC: 136 MG/DL (ref 70–99)
GLUCOSE UR STRIP-MCNC: NEGATIVE MG/DL
HCO3 BLD-SCNC: 22 MMOL/L (ref 21–28)
HCO3 SERPL-SCNC: 20 MMOL/L (ref 22–29)
HCT VFR BLD AUTO: 39.8 % (ref 40–53)
HGB BLD-MCNC: 13.1 G/DL (ref 13.3–17.7)
HGB UR QL STRIP: NEGATIVE
HOLD SPECIMEN: NORMAL
HYALINE CASTS: 2 /LPF
IMM GRANULOCYTES # BLD: 0 10E3/UL
IMM GRANULOCYTES NFR BLD: 0 %
INR PPP: 1.06 (ref 0.85–1.15)
KETONES UR STRIP-MCNC: NEGATIVE MG/DL
LACTATE SERPL-SCNC: 1.6 MMOL/L (ref 0.7–2)
LEUKOCYTE ESTERASE UR QL STRIP: NEGATIVE
LYMPHOCYTES # BLD AUTO: 1.3 10E3/UL (ref 0.8–5.3)
LYMPHOCYTES NFR BLD AUTO: 34 %
MCH RBC QN AUTO: 29.2 PG (ref 26.5–33)
MCHC RBC AUTO-ENTMCNC: 32.9 G/DL (ref 31.5–36.5)
MCV RBC AUTO: 89 FL (ref 78–100)
MONOCYTES # BLD AUTO: 0.2 10E3/UL (ref 0–1.3)
MONOCYTES NFR BLD AUTO: 5 %
MUCOUS THREADS #/AREA URNS LPF: PRESENT /LPF
NEUTROPHILS # BLD AUTO: 2.1 10E3/UL (ref 1.6–8.3)
NEUTROPHILS NFR BLD AUTO: 57 %
NITRATE UR QL: NEGATIVE
NRBC # BLD AUTO: 0 10E3/UL
NRBC BLD AUTO-RTO: 0 /100
NT-PROBNP SERPL-MCNC: <36 PG/ML (ref 0–450)
O2/TOTAL GAS SETTING VFR VENT: 21 %
OPIATES UR QL SCN: NORMAL
OXYHGB MFR BLDA: 95 % (ref 92–100)
PCO2 BLD: 35 MM HG (ref 35–45)
PCP QUAL URINE (ROCHE): NORMAL
PEEP: 0 CM H2O
PH BLD: 7.4 [PH] (ref 7.35–7.45)
PH UR STRIP: 5.5 [PH] (ref 5–9)
PLATELET # BLD AUTO: 255 10E3/UL (ref 150–450)
PO2 BLD: 74 MM HG (ref 80–105)
POTASSIUM SERPL-SCNC: 3.7 MMOL/L (ref 3.4–5.3)
PROT SERPL-MCNC: 7 G/DL (ref 6.4–8.3)
RBC # BLD AUTO: 4.49 10E6/UL (ref 4.4–5.9)
RBC URINE: 0 /HPF
RSV RNA SPEC NAA+PROBE: NEGATIVE
SAO2 % BLDA: 96.8 % (ref 96–97)
SARS-COV-2 RNA RESP QL NAA+PROBE: NEGATIVE
SODIUM SERPL-SCNC: 138 MMOL/L (ref 135–145)
SP GR UR STRIP: 1.02 (ref 1–1.03)
TROPONIN T SERPL HS-MCNC: 6 NG/L
TROPONIN T SERPL HS-MCNC: 7 NG/L
TSH SERPL DL<=0.005 MIU/L-ACNC: 0.5 UIU/ML (ref 0.3–4.2)
UROBILINOGEN UR STRIP-MCNC: NORMAL MG/DL
WBC # BLD AUTO: 3.7 10E3/UL (ref 4–11)
WBC URINE: 1 /HPF

## 2024-12-29 PROCEDURE — 83880 ASSAY OF NATRIURETIC PEPTIDE: CPT | Performed by: PHYSICIAN ASSISTANT

## 2024-12-29 PROCEDURE — 74019 RADEX ABDOMEN 2 VIEWS: CPT

## 2024-12-29 PROCEDURE — G0378 HOSPITAL OBSERVATION PER HR: HCPCS

## 2024-12-29 PROCEDURE — 85025 COMPLETE CBC W/AUTO DIFF WBC: CPT | Performed by: PHYSICIAN ASSISTANT

## 2024-12-29 PROCEDURE — 86618 LYME DISEASE ANTIBODY: CPT | Performed by: PHYSICIAN ASSISTANT

## 2024-12-29 PROCEDURE — 84155 ASSAY OF PROTEIN SERUM: CPT | Performed by: PHYSICIAN ASSISTANT

## 2024-12-29 PROCEDURE — 96376 TX/PRO/DX INJ SAME DRUG ADON: CPT | Performed by: PHYSICIAN ASSISTANT

## 2024-12-29 PROCEDURE — 84450 TRANSFERASE (AST) (SGOT): CPT | Performed by: PHYSICIAN ASSISTANT

## 2024-12-29 PROCEDURE — 96375 TX/PRO/DX INJ NEW DRUG ADDON: CPT | Performed by: PHYSICIAN ASSISTANT

## 2024-12-29 PROCEDURE — 71045 X-RAY EXAM CHEST 1 VIEW: CPT

## 2024-12-29 PROCEDURE — 36600 WITHDRAWAL OF ARTERIAL BLOOD: CPT | Performed by: PHYSICIAN ASSISTANT

## 2024-12-29 PROCEDURE — 84443 ASSAY THYROID STIM HORMONE: CPT | Performed by: PHYSICIAN ASSISTANT

## 2024-12-29 PROCEDURE — 96361 HYDRATE IV INFUSION ADD-ON: CPT | Performed by: PHYSICIAN ASSISTANT

## 2024-12-29 PROCEDURE — 85610 PROTHROMBIN TIME: CPT | Performed by: PHYSICIAN ASSISTANT

## 2024-12-29 PROCEDURE — 87637 SARSCOV2&INF A&B&RSV AMP PRB: CPT | Performed by: PHYSICIAN ASSISTANT

## 2024-12-29 PROCEDURE — 81001 URINALYSIS AUTO W/SCOPE: CPT | Performed by: PHYSICIAN ASSISTANT

## 2024-12-29 PROCEDURE — 84460 ALANINE AMINO (ALT) (SGPT): CPT | Performed by: PHYSICIAN ASSISTANT

## 2024-12-29 PROCEDURE — 71275 CT ANGIOGRAPHY CHEST: CPT

## 2024-12-29 PROCEDURE — 250N000011 HC RX IP 250 OP 636: Performed by: PHYSICIAN ASSISTANT

## 2024-12-29 PROCEDURE — 70450 CT HEAD/BRAIN W/O DYE: CPT

## 2024-12-29 PROCEDURE — 93005 ELECTROCARDIOGRAM TRACING: CPT | Performed by: PHYSICIAN ASSISTANT

## 2024-12-29 PROCEDURE — 80307 DRUG TEST PRSMV CHEM ANLYZR: CPT | Performed by: PHYSICIAN ASSISTANT

## 2024-12-29 PROCEDURE — 250N000009 HC RX 250: Performed by: PHYSICIAN ASSISTANT

## 2024-12-29 PROCEDURE — 87468 ANAPLSMA PHGCYTOPHLM AMP PRB: CPT | Performed by: PHYSICIAN ASSISTANT

## 2024-12-29 PROCEDURE — 99285 EMERGENCY DEPT VISIT HI MDM: CPT | Performed by: PHYSICIAN ASSISTANT

## 2024-12-29 PROCEDURE — 82077 ASSAY SPEC XCP UR&BREATH IA: CPT | Performed by: PHYSICIAN ASSISTANT

## 2024-12-29 PROCEDURE — 82805 BLOOD GASES W/O2 SATURATION: CPT | Performed by: PHYSICIAN ASSISTANT

## 2024-12-29 PROCEDURE — 84484 ASSAY OF TROPONIN QUANT: CPT | Performed by: PHYSICIAN ASSISTANT

## 2024-12-29 PROCEDURE — 258N000003 HC RX IP 258 OP 636: Performed by: PHYSICIAN ASSISTANT

## 2024-12-29 PROCEDURE — 93010 ELECTROCARDIOGRAM REPORT: CPT | Performed by: INTERNAL MEDICINE

## 2024-12-29 PROCEDURE — 96365 THER/PROPH/DIAG IV INF INIT: CPT | Performed by: PHYSICIAN ASSISTANT

## 2024-12-29 PROCEDURE — 99285 EMERGENCY DEPT VISIT HI MDM: CPT | Mod: 25 | Performed by: PHYSICIAN ASSISTANT

## 2024-12-29 PROCEDURE — 96366 THER/PROPH/DIAG IV INF ADDON: CPT | Performed by: PHYSICIAN ASSISTANT

## 2024-12-29 PROCEDURE — 250N000013 HC RX MED GY IP 250 OP 250 PS 637: Performed by: PHYSICIAN ASSISTANT

## 2024-12-29 PROCEDURE — 85379 FIBRIN DEGRADATION QUANT: CPT | Performed by: PHYSICIAN ASSISTANT

## 2024-12-29 PROCEDURE — 83605 ASSAY OF LACTIC ACID: CPT | Performed by: PHYSICIAN ASSISTANT

## 2024-12-29 PROCEDURE — 36415 COLL VENOUS BLD VENIPUNCTURE: CPT | Performed by: PHYSICIAN ASSISTANT

## 2024-12-29 PROCEDURE — 86140 C-REACTIVE PROTEIN: CPT | Performed by: PHYSICIAN ASSISTANT

## 2024-12-29 RX ORDER — CLONIDINE HYDROCHLORIDE 0.1 MG/1
0.1 TABLET ORAL ONCE
Status: COMPLETED | OUTPATIENT
Start: 2024-12-29 | End: 2024-12-29

## 2024-12-29 RX ORDER — DEXAMETHASONE SODIUM PHOSPHATE 10 MG/ML
10 INJECTION, SOLUTION INTRAMUSCULAR; INTRAVENOUS ONCE
Status: COMPLETED | OUTPATIENT
Start: 2024-12-29 | End: 2024-12-29

## 2024-12-29 RX ORDER — ONDANSETRON 2 MG/ML
4 INJECTION INTRAMUSCULAR; INTRAVENOUS ONCE
Status: COMPLETED | OUTPATIENT
Start: 2024-12-29 | End: 2024-12-29

## 2024-12-29 RX ORDER — LIDOCAINE HYDROCHLORIDE 20 MG/ML
10 SOLUTION OROPHARYNGEAL ONCE
Status: COMPLETED | OUTPATIENT
Start: 2024-12-29 | End: 2024-12-29

## 2024-12-29 RX ORDER — IOPAMIDOL 755 MG/ML
97 INJECTION, SOLUTION INTRAVASCULAR ONCE
Status: COMPLETED | OUTPATIENT
Start: 2024-12-29 | End: 2024-12-29

## 2024-12-29 RX ORDER — KETOROLAC TROMETHAMINE 30 MG/ML
30 INJECTION, SOLUTION INTRAMUSCULAR; INTRAVENOUS ONCE
Status: COMPLETED | OUTPATIENT
Start: 2024-12-29 | End: 2024-12-29

## 2024-12-29 RX ORDER — HYDROXYZINE PAMOATE 25 MG/1
25 CAPSULE ORAL ONCE
Status: COMPLETED | OUTPATIENT
Start: 2024-12-29 | End: 2024-12-29

## 2024-12-29 RX ORDER — METOCLOPRAMIDE HYDROCHLORIDE 5 MG/ML
10 INJECTION INTRAMUSCULAR; INTRAVENOUS ONCE
Status: COMPLETED | OUTPATIENT
Start: 2024-12-29 | End: 2024-12-29

## 2024-12-29 RX ORDER — DIPHENHYDRAMINE HYDROCHLORIDE 50 MG/ML
12.5 INJECTION INTRAMUSCULAR; INTRAVENOUS ONCE
Status: COMPLETED | OUTPATIENT
Start: 2024-12-29 | End: 2024-12-29

## 2024-12-29 RX ORDER — MAGNESIUM HYDROXIDE/ALUMINUM HYDROXICE/SIMETHICONE 120; 1200; 1200 MG/30ML; MG/30ML; MG/30ML
30 SUSPENSION ORAL ONCE
Status: COMPLETED | OUTPATIENT
Start: 2024-12-29 | End: 2024-12-29

## 2024-12-29 RX ORDER — HYDRALAZINE HYDROCHLORIDE 20 MG/ML
10 INJECTION INTRAMUSCULAR; INTRAVENOUS ONCE
Status: COMPLETED | OUTPATIENT
Start: 2024-12-29 | End: 2024-12-29

## 2024-12-29 RX ORDER — CLONIDINE HYDROCHLORIDE 0.1 MG/1
0.2 TABLET ORAL ONCE
Status: COMPLETED | OUTPATIENT
Start: 2024-12-29 | End: 2024-12-29

## 2024-12-29 RX ADMIN — DEXAMETHASONE SODIUM PHOSPHATE 10 MG: 10 INJECTION INTRAMUSCULAR; INTRAVENOUS at 16:45

## 2024-12-29 RX ADMIN — ONDANSETRON 4 MG: 2 INJECTION INTRAMUSCULAR; INTRAVENOUS at 14:34

## 2024-12-29 RX ADMIN — IOPAMIDOL 97 ML: 755 INJECTION, SOLUTION INTRAVENOUS at 20:41

## 2024-12-29 RX ADMIN — HYDROXYZINE PAMOATE 25 MG: 25 CAPSULE ORAL at 15:31

## 2024-12-29 RX ADMIN — SODIUM CHLORIDE 1000 ML: 9 INJECTION, SOLUTION INTRAVENOUS at 14:05

## 2024-12-29 RX ADMIN — CLONIDINE HYDROCHLORIDE 0.1 MG: 0.1 TABLET ORAL at 14:05

## 2024-12-29 RX ADMIN — LIDOCAINE HYDROCHLORIDE 10 ML: 20 SOLUTION ORAL at 14:34

## 2024-12-29 RX ADMIN — ALUMINUM HYDROXIDE, MAGNESIUM HYDROXIDE, AND SIMETHICONE 30 ML: 200; 200; 20 SUSPENSION ORAL at 14:34

## 2024-12-29 RX ADMIN — METOCLOPRAMIDE 10 MG: 5 INJECTION, SOLUTION INTRAMUSCULAR; INTRAVENOUS at 16:45

## 2024-12-29 RX ADMIN — HYDRALAZINE HYDROCHLORIDE 10 MG: 20 INJECTION INTRAMUSCULAR; INTRAVENOUS at 16:45

## 2024-12-29 RX ADMIN — KETOROLAC TROMETHAMINE 30 MG: 30 INJECTION, SOLUTION INTRAMUSCULAR at 16:45

## 2024-12-29 RX ADMIN — CLONIDINE HYDROCHLORIDE 0.2 MG: 0.1 TABLET ORAL at 18:46

## 2024-12-29 RX ADMIN — HYDRALAZINE HYDROCHLORIDE 10 MG: 20 INJECTION INTRAMUSCULAR; INTRAVENOUS at 18:22

## 2024-12-29 RX ADMIN — LABETALOL HYDROCHLORIDE 1 MG/MIN: 5 INJECTION, SOLUTION INTRAVENOUS at 21:34

## 2024-12-29 RX ADMIN — SODIUM CHLORIDE 90 ML: 9 INJECTION, SOLUTION INTRAVENOUS at 20:42

## 2024-12-29 RX ADMIN — HYDROMORPHONE HYDROCHLORIDE 1 MG: 1 INJECTION, SOLUTION INTRAMUSCULAR; INTRAVENOUS; SUBCUTANEOUS at 15:31

## 2024-12-29 RX ADMIN — DIPHENHYDRAMINE HYDROCHLORIDE 12.5 MG: 50 INJECTION, SOLUTION INTRAMUSCULAR; INTRAVENOUS at 16:50

## 2024-12-29 ASSESSMENT — ACTIVITIES OF DAILY LIVING (ADL)
ADLS_ACUITY_SCORE: 41

## 2024-12-29 ASSESSMENT — LIFESTYLE VARIABLES
AUDITORY DISTURBANCES: NOT PRESENT
HEADACHE, FULLNESS IN HEAD: SEVERE
AGITATION: NORMAL ACTIVITY
TREMOR: NO TREMOR
TOTAL SCORE: 6
VISUAL DISTURBANCES: NOT PRESENT
NAUSEA AND VOMITING: MILD NAUSEA WITH NO VOMITING
ORIENTATION AND CLOUDING OF SENSORIUM: ORIENTED AND CAN DO SERIAL ADDITIONS
PAROXYSMAL SWEATS: NO SWEAT VISIBLE
ANXIETY: NO ANXIETY, AT EASE

## 2024-12-29 NOTE — ED PROVIDER NOTES
EMERGENCY DEPARTMENT ENCOUNTER      NAME: Bam Orlando  AGE: 44 year old male  YOB: 1980  MRN: [id number]  EVALUATION DATE & TIME: 12/29/2024  1:17 PM    PCP: No Ref-Primary, Physician    ED PROVIDER: Pascual Al PA-C       CHIEF COMPLAINT:  Chief Complaint   Patient presents with    Chest Pain    Shortness of Breath         HPI  Bam Orlando is a pleasant 44 year old male who presents to the ER via private vehicle for concerns of chest pain, shortness of breath, dyspnea.  Patient has been having symptoms ongoing for the past 3 days.  Substernal chest discomfort that is provoked with activity.  Also having bodyaches, joint pain, and headache.  Patient also endorses abdominal pain but he has been having this for the past month.  Has been seen in the ER twice with 2 prior abdominal CT showing concern for gastritis versus possible ulcer.  Has not yet seen GI.  No upper endoscopies performed.  Patient feeling nauseous and been vomiting.  No hematemesis.  No bloody or melanotic stools.  Patient with remote history of alcohol use disorder.  Has been sober for the past 5 years.  No other illicit drugs.  Patient is concerned about possible influenza infection      REVIEW OF SYSTEMS   Review of Systems  As above, otherwise ROS is unremarkable.      PAST MEDICAL HISTORY:  No past medical history on file.      PAST SURGICAL HISTORY:  No past surgical history on file.      CURRENT MEDICATIONS:    Current Outpatient Medications   Medication Instructions    bisacodyl (DULCOLAX) 5 MG EC tablet Take as directed by colonoscopy prep instructions    DULoxetine (CYMBALTA) 60 mg, Oral    famotidine (PEPCID) 20 mg, Oral, 2 TIMES DAILY    HYDROcodone-acetaminophen (NORCO) 5-325 MG tablet 1 tablet, Oral, EVERY 6 HOURS PRN, Do not drive or operate machinery for six hours after taking this medicine. This medicine will make you more likely to fall so please be careful.  Do not use firearms for six hours  "after taking this medicine.    omeprazole (PRILOSEC) 40 mg, Oral, DAILY    ondansetron (ZOFRAN ODT) 4 mg, Oral, EVERY 6 HOURS PRN    oxyCODONE-acetaminophen (PERCOCET) 5-325 MG tablet 1 tablet, Oral, EVERY 6 HOURS PRN    pantoprazole (PROTONIX) 20 mg, Oral, DAILY    QUEtiapine ER (SEROQUEL XR) 300 mg, Oral         ALLERGIES:  Allergies   Allergen Reactions    Tramadol Other (See Comments)     Seizure          FAMILY HISTORY:  No family history on file.      SOCIAL HISTORY:   Social History     Socioeconomic History    Marital status: Single   Tobacco Use    Smoking status: Every Day     Current packs/day: 1.00     Types: Cigarettes    Smokeless tobacco: Never   Vaping Use    Vaping status: Every Day    Substances: Nicotine   Substance and Sexual Activity    Alcohol use: Not Currently    Drug use: Not Currently     Types: Methamphetamines     Comment: per 8/23/24 OV note     Social Drivers of Health     Food Insecurity: Unknown (8/23/2024)    Food Insecurity     Within the past 12 months, did you worry that your food would run out before you got money to buy more?: Patient declined     Within the past 12 months, did the food you bought just not last and you didn t have money to get more?: Patient declined   Interpersonal Safety: Low Risk  (8/23/2024)    Interpersonal Safety     Do you feel physically and emotionally safe where you currently live?: Yes     Within the past 12 months, have you been hit, slapped, kicked or otherwise physically hurt by someone?: No     Within the past 12 months, have you been humiliated or emotionally abused in other ways by your partner or ex-partner?: No       ==================================================================================================================================    PHYSICAL EXAM    VITAL SIGNS: /81   Pulse 79   Temp 98.5  F (36.9  C) (Temporal)   Resp 12   Ht 1.727 m (5' 8\")   Wt 63.5 kg (140 lb)   SpO2 93%   BMI 21.29 kg/m      Patient Vitals for " "the past 24 hrs:   BP Temp Temp src Pulse Resp SpO2 Height Weight   12/29/24 2340 118/81 -- -- 79 12 93 % -- --   12/29/24 2339 118/81 -- -- 80 17 98 % -- --   12/29/24 2259 (!) 168/157 -- -- 79 12 96 % -- --   12/29/24 2230 -- -- -- 79 14 95 % -- --   12/29/24 2215 -- -- -- 80 11 94 % -- --   12/29/24 2200 -- -- -- 79 23 95 % -- --   12/29/24 2145 -- -- -- 83 (!) 9 93 % -- --   12/29/24 2130 (!) 216/171 -- -- 92 11 95 % -- --   12/29/24 1900 -- -- -- 105 12 95 % -- --   12/29/24 1845 (!) 181/165 -- -- 101 (!) 9 96 % -- --   12/29/24 1830 (!) 251/119 -- -- 101 12 95 % -- --   12/29/24 1822 (!) 215/199 -- -- 96 27 96 % -- --   12/29/24 1730 -- -- -- 97 (!) 6 96 % -- --   12/29/24 1715 (!) 176/95 -- -- 103 10 97 % -- --   12/29/24 1700 -- -- -- 100 (!) 32 -- -- --   12/29/24 1645 (!) 197/121 -- -- 100 11 -- -- --   12/29/24 1527 (!) 157/104 -- -- -- -- -- -- --   12/29/24 1515 -- -- -- 105 10 94 % -- --   12/29/24 1500 -- -- -- 105 11 96 % -- --   12/29/24 1445 -- -- -- 106 10 96 % -- --   12/29/24 1430 -- -- -- 106 11 96 % -- --   12/29/24 1415 -- -- -- 104 12 97 % -- --   12/29/24 1400 (!) 142/104 -- -- 107 -- 97 % -- --   12/29/24 1345 -- -- -- 110 (!) 9 96 % -- --   12/29/24 1330 -- -- -- 105 10 96 % -- --   12/29/24 1322 -- -- -- 120 20 99 % -- --   12/29/24 1321 (!) 133/112 98.5  F (36.9  C) Temporal -- 18 -- 1.727 m (5' 8\") 63.5 kg (140 lb)       Physical Exam  Vitals and nursing note reviewed.   Constitutional:       Appearance: He is well-developed. He is not ill-appearing or diaphoretic.      Comments: Patient appeared to be in discomfort from pain.   HENT:      Right Ear: Tympanic membrane, ear canal and external ear normal.      Left Ear: Tympanic membrane, ear canal and external ear normal.      Nose: Nose normal.      Mouth/Throat:      Mouth: Mucous membranes are moist.      Pharynx: Oropharynx is clear.   Eyes:      Extraocular Movements: Extraocular movements intact.      Conjunctiva/sclera: " Conjunctivae normal.      Pupils: Pupils are equal, round, and reactive to light.   Cardiovascular:      Rate and Rhythm: Normal rate and regular rhythm.      Pulses: Normal pulses.      Heart sounds: Normal heart sounds. No murmur heard.     No friction rub. No gallop.   Pulmonary:      Effort: Pulmonary effort is normal.      Breath sounds: Normal breath sounds. No wheezing, rhonchi or rales.   Chest:      Chest wall: No tenderness.   Abdominal:      General: Abdomen is flat.      Palpations: Abdomen is soft.      Tenderness: There is generalized abdominal tenderness. There is no guarding or rebound.   Musculoskeletal:         General: Normal range of motion.      Cervical back: Normal range of motion.      Right lower leg: No edema.      Left lower leg: No edema.   Skin:     General: Skin is warm and dry.   Neurological:      General: No focal deficit present.      Mental Status: He is alert.   Psychiatric:         Mood and Affect: Mood normal.            ==================================================================================================================================    LABS & RADIOLOGY:  All pertinent labs reviewed and interpreted. Reviewed all pertinent imaging. Please see official radiology report.  Results for orders placed or performed during the hospital encounter of 12/29/24   XR Chest Port 1 View    Impression    IMPRESSION: No pneumothorax or pleural effusion. No focal consolidation. Cardiac mediastinal silhouette within normal limits.   XR Abdomen 2 Views    Impression    IMPRESSION: Nonobstructive bowel gas pattern. Moderate to large amount of formed stool throughout the colon. No pneumatosis or pneumoperitoneum. Lung bases are clear.   CT Head w/o Contrast    Impression    IMPRESSION:    1.  No evidence of acute intracranial hemorrhage or mass effect.   CTA Chest Abdomen Pelvis w Contrast    Impression    IMPRESSION:   1.  No evidence of thoracoabdominal aortic aneurysm or  dissection.  2.  Extensive pancreatic parenchymal calcification, likely sequela of chronic pancreatitis.  3.  4.3 cm peripherally calcified lesion in the spleen, not significantly changed as compared to 8/18/2024 exam, could represent splenic cyst/pseudocyst related to prior splenic injury.  4.  Moderate to large amount of stool in the colon, nonspecific, can be seen with constipation.     D dimer quantitative   Result Value Ref Range    D-Dimer Quantitative <=0.27 0.00 - 0.50 ug/mL FEU   Comprehensive metabolic panel   Result Value Ref Range    Sodium 138 135 - 145 mmol/L    Potassium 3.7 3.4 - 5.3 mmol/L    Carbon Dioxide (CO2) 20 (L) 22 - 29 mmol/L    Anion Gap 15 7 - 15 mmol/L    Urea Nitrogen 7.3 6.0 - 20.0 mg/dL    Creatinine 0.90 0.67 - 1.17 mg/dL    GFR Estimate >90 >60 mL/min/1.73m2    Calcium 9.1 8.8 - 10.4 mg/dL    Chloride 103 98 - 107 mmol/L    Glucose 136 (H) 70 - 99 mg/dL    Alkaline Phosphatase 150 40 - 150 U/L    AST 24 0 - 45 U/L    ALT 17 0 - 70 U/L    Protein Total 7.0 6.4 - 8.3 g/dL    Albumin 4.2 3.5 - 5.2 g/dL    Bilirubin Total 0.3 <=1.2 mg/dL   Lactic acid whole blood with 1x repeat in 2 hr when >2   Result Value Ref Range    Lactic Acid, Initial 1.6 0.7 - 2.0 mmol/L   Result Value Ref Range    Troponin T, High Sensitivity 6 <=22 ng/L   TSH Reflex GH   Result Value Ref Range    TSH 0.50 0.30 - 4.20 uIU/mL   Nt probnp inpatient (BNP)   Result Value Ref Range    N terminal Pro BNP Inpatient <36 0 - 450 pg/mL   Blood gas arterial   Result Value Ref Range    pH Arterial 7.40 7.35 - 7.45    pCO2 Arterial 35 35 - 45 mm Hg    pO2 Arterial 74 (L) 80 - 105 mm Hg    FIO2 21     Bicarbonate Arterial 22 21 - 28 mmol/L    Base Excess/Deficit Arterial -2.6 -3.0 - 3.0 mmol/L    Andry's Test No     Oxyhemoglobin Arterial 95 92 - 100 %    O2 Sat, Arterial 96.8 96.0 - 97.0 %    Peep 0 cm H2O   Result Value Ref Range    CRP Inflammation <3.00 <5.00 mg/L   Influenza A/B, RSV and SARS-CoV2 PCR (COVID-19) Nose     Specimen: Nose; Swab   Result Value Ref Range    Influenza A PCR Negative Negative    Influenza B PCR Negative Negative    RSV PCR Negative Negative    SARS CoV2 PCR Negative Negative   UA with Microscopic reflex to Culture    Specimen: Urine, NOS   Result Value Ref Range    Color Urine Light Yellow Colorless, Straw, Light Yellow, Yellow    Appearance Urine Clear Clear    Glucose Urine Negative Negative mg/dL    Bilirubin Urine Negative Negative    Ketones Urine Negative Negative mg/dL    Specific Gravity Urine 1.018 1.000 - 1.030    Blood Urine Negative Negative    pH Urine 5.5 5.0 - 9.0    Protein Albumin Urine Negative Negative mg/dL    Urobilinogen Urine Normal Normal, 2.0 mg/dL    Nitrite Urine Negative Negative    Leukocyte Esterase Urine Negative Negative    Mucus Urine Present (A) None Seen /LPF    RBC Urine 0 <=2 /HPF    WBC Urine 1 <=5 /HPF    Hyaline Casts Urine 2 <=2 /LPF   CBC with platelets and differential   Result Value Ref Range    WBC Count 3.7 (L) 4.0 - 11.0 10e3/uL    RBC Count 4.49 4.40 - 5.90 10e6/uL    Hemoglobin 13.1 (L) 13.3 - 17.7 g/dL    Hematocrit 39.8 (L) 40.0 - 53.0 %    MCV 89 78 - 100 fL    MCH 29.2 26.5 - 33.0 pg    MCHC 32.9 31.5 - 36.5 g/dL    RDW 15.0 10.0 - 15.0 %    Platelet Count 255 150 - 450 10e3/uL    % Neutrophils 57 %    % Lymphocytes 34 %    % Monocytes 5 %    % Eosinophils 1 %    % Basophils 2 %    % Immature Granulocytes 0 %    NRBCs per 100 WBC 0 <1 /100    Absolute Neutrophils 2.1 1.6 - 8.3 10e3/uL    Absolute Lymphocytes 1.3 0.8 - 5.3 10e3/uL    Absolute Monocytes 0.2 0.0 - 1.3 10e3/uL    Absolute Eosinophils 0.1 0.0 - 0.7 10e3/uL    Absolute Basophils 0.1 0.0 - 0.2 10e3/uL    Absolute Immature Granulocytes 0.0 <=0.4 10e3/uL    Absolute NRBCs 0.0 10e3/uL   Extra Red Top Tube   Result Value Ref Range    Hold Specimen x    Ethanol GH   Result Value Ref Range    Alcohol ethyl <0.01 <=0.01 g/dL   Result Value Ref Range    INR 1.06 0.85 - 1.15   Result Value Ref Range     Troponin T, High Sensitivity 7 <=22 ng/L   Urine Drug Screen Panel   Result Value Ref Range    Amphetamines Urine Screen Negative Screen Negative    Barbituates Urine Screen Negative Screen Negative    Benzodiazepine Urine Screen Negative Screen Negative    Cannabinoids Urine Screen Negative Screen Negative    Cocaine Urine Screen Negative Screen Negative    Fentanyl Qual Urine Screen Negative Screen Negative    Opiates Urine Screen Negative Screen Negative    PCP Urine Screen Negative Screen Negative     CTA Chest Abdomen Pelvis w Contrast   Final Result   IMPRESSION:    1.  No evidence of thoracoabdominal aortic aneurysm or dissection.   2.  Extensive pancreatic parenchymal calcification, likely sequela of chronic pancreatitis.   3.  4.3 cm peripherally calcified lesion in the spleen, not significantly changed as compared to 8/18/2024 exam, could represent splenic cyst/pseudocyst related to prior splenic injury.   4.  Moderate to large amount of stool in the colon, nonspecific, can be seen with constipation.         XR Abdomen 2 Views   Final Result   IMPRESSION: Nonobstructive bowel gas pattern. Moderate to large amount of formed stool throughout the colon. No pneumatosis or pneumoperitoneum. Lung bases are clear.      CT Head w/o Contrast   Final Result   IMPRESSION:     1.  No evidence of acute intracranial hemorrhage or mass effect.      XR Chest Port 1 View   Final Result   IMPRESSION: No pneumothorax or pleural effusion. No focal consolidation. Cardiac mediastinal silhouette within normal limits.            EKG:    EKG reviewed at 1323.  1) Rhythm: Sinus tachycardia  2) Rate: ventricular rate 119 bpm.  3) QRS Axis: No axis deviation  4) P waves/ MI interval: Sinus. Nml UNIQUE. No atrial enlargement  5) QRS complex: Narrow. No BBB. No ventricular hypertrophy. No pathological Q waves.  6) ST Segment: No acute ST segment elevation or depression  7) T waves: No T wave inversions. No peaked or flattened T waves.    "  I have independently reviewed and interpreted today's EKG, pending cardiologist over read.       ==================================================================================================================================    ED COURSE, MEDICAL DECISION MAKING, FINAL IMPRESSION AND PLAN:     Assessment / Plan:  1. Hypertensive urgency    2. Leukopenia, unspecified type    3. Chronic pancreatitis, unspecified pancreatitis type (H)        The patient was interviewed and examined.  HPI and physical exam as below.  Differential diagnosis and MDM Key Documentation Elements as below.  Vitals, triage note, and nursing notes were reviewed.  /81   Pulse 79   Temp 98.5  F (36.9  C) (Temporal)   Resp 12   Ht 1.727 m (5' 8\")   Wt 63.5 kg (140 lb)   SpO2 93%   BMI 21.29 kg/m      Differential includes but is not limited to influenza, COVID, RSV, unstable angina, ACS/MI, pneumonia, PE, dissection    Patient was afebrile did not have markedly elevated blood pressures.  3 different machines were used patient having markedly elevated blood pressures into the 200s systolically and into the 200s diastolically.  Patient with a slightly low white blood cell count 3700 hemoglobin 13.1.  Glucose 136.  Remainder laboratory studies were otherwise reassuring.  EKG with no ST segment deviation suggest ACS/MI.  Screen troponin is not elevated, less likely ACS/MI.  Normal BNP, less likely acute heart failure.  Influenza, COVID, RSV were negative.  D-dimer not elevated.  UA negative for signs of hematuria or UTI.  UDS screen negative.  Alcohol negative.  Patient did endorse a tick bite, Lyme disease/tickborne panel was added.    Patient was given initially clonidine 0.1 mg and IV hydralazine her blood pressure.  Patient had brief improvement in blood pressures that then quickly resolves to his elevated blood pressures.  Patient endorsed a headache.  He was given a migraine cocktail to include IV Toradol, IV Reglan, IV " Decadron and oral Vistaril with only minimal improvement in headache.  Patient also continues to have chest discomfort and abdominal pain.  Patient given IV Dilaudid which did help with pain.  Patient was given another dose of IV hydralazine 10 mg and oral clonidine 0.2 mg, again with only brief improvement in blood pressures before going back to elevation of blood pressure.  I did start patient IV labetalol drip which did improve blood pressures.  Blood pressures would go back into normal range which would necessitate stopping labetalol but then blood pressures will go back to being markedly elevated.  At this point, decision made to admit patient to ICU for further monitoring.  Discussed with Dr. Calle, hospitalist service, who was kind enough to accept patient.  Patient was admitted in stable condition.  Also obtain CTA chest, abdomen, and pelvis with no signs of acute aneurysm or dissection.  Chronically calcified pancreas and spleen were noted, large amounts of stool in the colon but no signs of small bowel obstruction.  No signs of acute findings.    Pertinent Labs & Imaging studies reviewed. (See chart for details)  Results for orders placed or performed during the hospital encounter of 12/29/24   XR Chest Port 1 View    Impression    IMPRESSION: No pneumothorax or pleural effusion. No focal consolidation. Cardiac mediastinal silhouette within normal limits.   XR Abdomen 2 Views    Impression    IMPRESSION: Nonobstructive bowel gas pattern. Moderate to large amount of formed stool throughout the colon. No pneumatosis or pneumoperitoneum. Lung bases are clear.   CT Head w/o Contrast    Impression    IMPRESSION:    1.  No evidence of acute intracranial hemorrhage or mass effect.   CTA Chest Abdomen Pelvis w Contrast    Impression    IMPRESSION:   1.  No evidence of thoracoabdominal aortic aneurysm or dissection.  2.  Extensive pancreatic parenchymal calcification, likely sequela of chronic pancreatitis.  3.  4.3  cm peripherally calcified lesion in the spleen, not significantly changed as compared to 8/18/2024 exam, could represent splenic cyst/pseudocyst related to prior splenic injury.  4.  Moderate to large amount of stool in the colon, nonspecific, can be seen with constipation.     D dimer quantitative   Result Value Ref Range    D-Dimer Quantitative <=0.27 0.00 - 0.50 ug/mL FEU   Comprehensive metabolic panel   Result Value Ref Range    Sodium 138 135 - 145 mmol/L    Potassium 3.7 3.4 - 5.3 mmol/L    Carbon Dioxide (CO2) 20 (L) 22 - 29 mmol/L    Anion Gap 15 7 - 15 mmol/L    Urea Nitrogen 7.3 6.0 - 20.0 mg/dL    Creatinine 0.90 0.67 - 1.17 mg/dL    GFR Estimate >90 >60 mL/min/1.73m2    Calcium 9.1 8.8 - 10.4 mg/dL    Chloride 103 98 - 107 mmol/L    Glucose 136 (H) 70 - 99 mg/dL    Alkaline Phosphatase 150 40 - 150 U/L    AST 24 0 - 45 U/L    ALT 17 0 - 70 U/L    Protein Total 7.0 6.4 - 8.3 g/dL    Albumin 4.2 3.5 - 5.2 g/dL    Bilirubin Total 0.3 <=1.2 mg/dL   Lactic acid whole blood with 1x repeat in 2 hr when >2   Result Value Ref Range    Lactic Acid, Initial 1.6 0.7 - 2.0 mmol/L   Result Value Ref Range    Troponin T, High Sensitivity 6 <=22 ng/L   TSH Reflex GH   Result Value Ref Range    TSH 0.50 0.30 - 4.20 uIU/mL   Nt probnp inpatient (BNP)   Result Value Ref Range    N terminal Pro BNP Inpatient <36 0 - 450 pg/mL   Blood gas arterial   Result Value Ref Range    pH Arterial 7.40 7.35 - 7.45    pCO2 Arterial 35 35 - 45 mm Hg    pO2 Arterial 74 (L) 80 - 105 mm Hg    FIO2 21     Bicarbonate Arterial 22 21 - 28 mmol/L    Base Excess/Deficit Arterial -2.6 -3.0 - 3.0 mmol/L    Andry's Test No     Oxyhemoglobin Arterial 95 92 - 100 %    O2 Sat, Arterial 96.8 96.0 - 97.0 %    Peep 0 cm H2O   Result Value Ref Range    CRP Inflammation <3.00 <5.00 mg/L   Influenza A/B, RSV and SARS-CoV2 PCR (COVID-19) Nose    Specimen: Nose; Swab   Result Value Ref Range    Influenza A PCR Negative Negative    Influenza B PCR Negative  Negative    RSV PCR Negative Negative    SARS CoV2 PCR Negative Negative   UA with Microscopic reflex to Culture    Specimen: Urine, NOS   Result Value Ref Range    Color Urine Light Yellow Colorless, Straw, Light Yellow, Yellow    Appearance Urine Clear Clear    Glucose Urine Negative Negative mg/dL    Bilirubin Urine Negative Negative    Ketones Urine Negative Negative mg/dL    Specific Gravity Urine 1.018 1.000 - 1.030    Blood Urine Negative Negative    pH Urine 5.5 5.0 - 9.0    Protein Albumin Urine Negative Negative mg/dL    Urobilinogen Urine Normal Normal, 2.0 mg/dL    Nitrite Urine Negative Negative    Leukocyte Esterase Urine Negative Negative    Mucus Urine Present (A) None Seen /LPF    RBC Urine 0 <=2 /HPF    WBC Urine 1 <=5 /HPF    Hyaline Casts Urine 2 <=2 /LPF   CBC with platelets and differential   Result Value Ref Range    WBC Count 3.7 (L) 4.0 - 11.0 10e3/uL    RBC Count 4.49 4.40 - 5.90 10e6/uL    Hemoglobin 13.1 (L) 13.3 - 17.7 g/dL    Hematocrit 39.8 (L) 40.0 - 53.0 %    MCV 89 78 - 100 fL    MCH 29.2 26.5 - 33.0 pg    MCHC 32.9 31.5 - 36.5 g/dL    RDW 15.0 10.0 - 15.0 %    Platelet Count 255 150 - 450 10e3/uL    % Neutrophils 57 %    % Lymphocytes 34 %    % Monocytes 5 %    % Eosinophils 1 %    % Basophils 2 %    % Immature Granulocytes 0 %    NRBCs per 100 WBC 0 <1 /100    Absolute Neutrophils 2.1 1.6 - 8.3 10e3/uL    Absolute Lymphocytes 1.3 0.8 - 5.3 10e3/uL    Absolute Monocytes 0.2 0.0 - 1.3 10e3/uL    Absolute Eosinophils 0.1 0.0 - 0.7 10e3/uL    Absolute Basophils 0.1 0.0 - 0.2 10e3/uL    Absolute Immature Granulocytes 0.0 <=0.4 10e3/uL    Absolute NRBCs 0.0 10e3/uL   Extra Red Top Tube   Result Value Ref Range    Hold Specimen x    Ethanol GH   Result Value Ref Range    Alcohol ethyl <0.01 <=0.01 g/dL   Result Value Ref Range    INR 1.06 0.85 - 1.15   Result Value Ref Range    Troponin T, High Sensitivity 7 <=22 ng/L   Urine Drug Screen Panel   Result Value Ref Range    Amphetamines  Urine Screen Negative Screen Negative    Barbituates Urine Screen Negative Screen Negative    Benzodiazepine Urine Screen Negative Screen Negative    Cannabinoids Urine Screen Negative Screen Negative    Cocaine Urine Screen Negative Screen Negative    Fentanyl Qual Urine Screen Negative Screen Negative    Opiates Urine Screen Negative Screen Negative    PCP Urine Screen Negative Screen Negative     Lab Results   Component Value Date    ABORH B POS 12/19/2024         Reassessments, Medications, Interventions, & Response to Treatments:  -as above    Medications given during today's ER visit:  Medications   labetalol 1000 mg/200 mL (NORMODYNE,TRANDATE) infusion (0 mg/min Intravenous Stopped 12/29/24 2342)   sodium chloride 0.9% BOLUS 1,000 mL (0 mLs Intravenous Stopped 12/29/24 1513)   cloNIDine (CATAPRES) tablet 0.1 mg (0.1 mg Oral $Given 12/29/24 1405)   ondansetron (ZOFRAN) injection 4 mg (4 mg Intravenous $Given 12/29/24 1434)   alum & mag hydroxide-simethicone (MAALOX) suspension 30 mL (30 mLs Oral $Given 12/29/24 1434)   lidocaine (viscous) (XYLOCAINE) 2 % solution 10 mL (10 mLs Swish & Swallow $Given 12/29/24 1434)   HYDROmorphone (DILAUDID) injection 1 mg (1 mg Intravenous $Given 12/29/24 1531)   hydrOXYzine cornell (VISTARIL) capsule 25 mg (25 mg Oral $Given 12/29/24 1531)   ketorolac (TORADOL) injection 30 mg (30 mg Intravenous $Given 12/29/24 1645)   dexAMETHasone PF (DECADRON) injection 10 mg (10 mg Intravenous $Given 12/29/24 1645)   metoclopramide (REGLAN) injection 10 mg (10 mg Intravenous $Given 12/29/24 1645)   diphenhydrAMINE (BENADRYL) injection 12.5 mg (12.5 mg Intravenous $Given 12/29/24 1650)   hydrALAZINE (APRESOLINE) injection 10 mg (10 mg Intravenous $Given 12/29/24 1645)   hydrALAZINE (APRESOLINE) injection 10 mg (10 mg Intravenous $Given 12/29/24 1822)   cloNIDine (CATAPRES) tablet 0.2 mg (0.2 mg Oral $Given 12/29/24 1846)   iopamidol (ISOVUE-370) solution 97 mL (97 mLs Intravenous $Given  12/29/24 2041)   sodium chloride 0.9 % bag 500 mL for CT scan flush use (90 mLs Intravenous $Given 12/29/24 2042)       Consultations:  None    Decision Rules, Medical Calculators, and Risk Stratification Tools:  None    MDM Key Documentation Elements for Patient's Evaluation:  Differential diagnosis to include high risk considerations: As above  Escalation to admission/observation considered: Admission/observation considered, patient admitted to ICU  Discussions and management with other clinicians:    3a. Independent interpretation of testing performed by another health professional:  -No  3b. Discussion of management or test interpretation with another health professional: -Yes  Independent interpretation of tests:  Ordering and/or review of 3+ test(s)  Discussion of test interpretations with radiology:  No  History obtained from source other than patient or assessment requiring an independent historian:  No  Review of non-ED/external records:  review of 3+ records  Diagnostic tests considered but not ultimately performed/deferred:  -Echocardiogram  Prescription medications considered but not prescribed:  -Patient was admitted  Chronic conditions affecting care:  -None  Care affected by social determinants of health:  -None    The patient's management involved:   - Laboratory studies  - Imaging studies  - Parenteral controlled substance  - Decision regarding hospitalization  - Drug therapy requiring intensive monitoring      A shared decision making model was used. Time was taken to answer all questions.  Patient and/or associated parties understood and were agreeable to treatment plan.  Plan and all results were discussed.     New prescriptions started at today's ER visit  New Prescriptions    No medications on file       ==================================================================================================================================      Nolan SCHULER PA-C, personally performed the services  described in this documentation, and it is both accurate and complete.       Pascual Al PA-C  12/30/24 0026

## 2024-12-29 NOTE — ED TRIAGE NOTES
"Pt arrive to ED alone via private vehicle. C/o midsternal chest pain that began about 3 days ago. Pain is worse with activity and he has been getting short of breath with activity and at rest. Pt also states he has been vomiting and had HA for 3 days with no relief from OTC medication.   BP (!) 133/112   Pulse 120   Temp 98.5  F (36.9  C) (Temporal)   Resp 20   Ht 1.727 m (5' 8\")   Wt 63.5 kg (140 lb)   SpO2 99%   BMI 21.29 kg/m         Triage Assessment (Adult)       Row Name 12/29/24 1318          Triage Assessment    Airway WDL WDL        Respiratory WDL    Respiratory WDL X  SOB        Skin Circulation/Temperature WDL    Skin Circulation/Temperature WDL WDL        Cardiac WDL    Cardiac WDL X;chest pain        Chest Pain Assessment    Chest Pain Location midsternal     Chest Pain Radiation back     Character pressure;stabbing     Precipitating Factors activity     Associated Signs/Symptoms dyspnea        Peripheral/Neurovascular WDL    Peripheral Neurovascular WDL WDL        Cognitive/Neuro/Behavioral WDL    Cognitive/Neuro/Behavioral WDL WDL                     "

## 2024-12-30 VITALS
TEMPERATURE: 98.5 F | WEIGHT: 140 LBS | HEART RATE: 76 BPM | OXYGEN SATURATION: 97 % | RESPIRATION RATE: 12 BRPM | SYSTOLIC BLOOD PRESSURE: 120 MMHG | DIASTOLIC BLOOD PRESSURE: 80 MMHG | BODY MASS INDEX: 21.22 KG/M2 | HEIGHT: 68 IN

## 2024-12-30 LAB
ATRIAL RATE - MUSE: 119 BPM
DIASTOLIC BLOOD PRESSURE - MUSE: NORMAL MMHG
INTERPRETATION ECG - MUSE: NORMAL
P AXIS - MUSE: 64 DEGREES
PR INTERVAL - MUSE: 132 MS
QRS DURATION - MUSE: 86 MS
QT - MUSE: 316 MS
QTC - MUSE: 444 MS
R AXIS - MUSE: 24 DEGREES
SYSTOLIC BLOOD PRESSURE - MUSE: NORMAL MMHG
T AXIS - MUSE: 58 DEGREES
VENTRICULAR RATE- MUSE: 119 BPM

## 2024-12-30 PROCEDURE — 250N000011 HC RX IP 250 OP 636: Performed by: PHYSICIAN ASSISTANT

## 2024-12-30 PROCEDURE — 250N000013 HC RX MED GY IP 250 OP 250 PS 637: Performed by: PHYSICIAN ASSISTANT

## 2024-12-30 PROCEDURE — 96376 TX/PRO/DX INJ SAME DRUG ADON: CPT | Performed by: PHYSICIAN ASSISTANT

## 2024-12-30 PROCEDURE — G0378 HOSPITAL OBSERVATION PER HR: HCPCS

## 2024-12-30 RX ORDER — NICOTINE POLACRILEX 4 MG
15-30 LOZENGE BUCCAL
Status: CANCELLED | OUTPATIENT
Start: 2024-12-30

## 2024-12-30 RX ORDER — HYDROMORPHONE HYDROCHLORIDE 1 MG/ML
0.5 INJECTION, SOLUTION INTRAMUSCULAR; INTRAVENOUS; SUBCUTANEOUS EVERY 30 MIN PRN
Status: DISCONTINUED | OUTPATIENT
Start: 2024-12-30 | End: 2024-12-30 | Stop reason: HOSPADM

## 2024-12-30 RX ORDER — DEXTROSE MONOHYDRATE 25 G/50ML
25-50 INJECTION, SOLUTION INTRAVENOUS
Status: CANCELLED | OUTPATIENT
Start: 2024-12-30

## 2024-12-30 RX ORDER — ONDANSETRON 2 MG/ML
4 INJECTION INTRAMUSCULAR; INTRAVENOUS EVERY 6 HOURS PRN
OUTPATIENT
Start: 2024-12-30

## 2024-12-30 RX ORDER — QUETIAPINE FUMARATE 100 MG/1
300 TABLET, FILM COATED ORAL ONCE
Status: COMPLETED | OUTPATIENT
Start: 2024-12-30 | End: 2024-12-30

## 2024-12-30 RX ORDER — ACETAMINOPHEN 325 MG/1
650 TABLET ORAL EVERY 4 HOURS PRN
Status: CANCELLED | OUTPATIENT
Start: 2024-12-30

## 2024-12-30 RX ORDER — ONDANSETRON 2 MG/ML
4 INJECTION INTRAMUSCULAR; INTRAVENOUS EVERY 6 HOURS PRN
Status: CANCELLED | OUTPATIENT
Start: 2024-12-30

## 2024-12-30 RX ORDER — ONDANSETRON 4 MG/1
4 TABLET, ORALLY DISINTEGRATING ORAL EVERY 6 HOURS PRN
Status: CANCELLED | OUTPATIENT
Start: 2024-12-30

## 2024-12-30 RX ORDER — ACETAMINOPHEN 325 MG/1
650 TABLET ORAL EVERY 4 HOURS PRN
OUTPATIENT
Start: 2024-12-30

## 2024-12-30 RX ORDER — MORPHINE SULFATE 15 MG/1
15 TABLET ORAL EVERY 8 HOURS PRN
COMMUNITY
Start: 2024-12-08

## 2024-12-30 RX ORDER — ACETAMINOPHEN 650 MG/20.3ML
650 LIQUID ORAL EVERY 4 HOURS PRN
Status: CANCELLED | OUTPATIENT
Start: 2024-12-30

## 2024-12-30 RX ORDER — CALCIUM CARBONATE 500 MG/1
1000 TABLET, CHEWABLE ORAL 4 TIMES DAILY PRN
OUTPATIENT
Start: 2024-12-30

## 2024-12-30 RX ORDER — ONDANSETRON 4 MG/1
4 TABLET, ORALLY DISINTEGRATING ORAL EVERY 6 HOURS PRN
OUTPATIENT
Start: 2024-12-30

## 2024-12-30 RX ADMIN — HYDROMORPHONE HYDROCHLORIDE 0.5 MG: 1 INJECTION, SOLUTION INTRAMUSCULAR; INTRAVENOUS; SUBCUTANEOUS at 01:31

## 2024-12-30 RX ADMIN — QUETIAPINE 300 MG: 100 TABLET ORAL at 02:41

## 2024-12-30 ASSESSMENT — ACTIVITIES OF DAILY LIVING (ADL)
ADLS_ACUITY_SCORE: 41

## 2024-12-30 NOTE — ED NOTES
Pt very upset when nurse went into room to discharge pt he stated that he cannot be discharged as he can't drive home and that he took his night time medications. But when nurse went in room pt was laying in bed watching tv. Pt became vocally upset and stated that we are just kicking people out without taking care of them. Nurse explained pts Bps have improved and that he will kirsten to follow up with PCP and his gastro. He stated he can't wait 3 months to get and EGD and that he needs to stay here. He was again directed to discharge and that he cannot stay in his room all night. Pt also stating why didn't the dr give me any new medications I need new medications.

## 2024-12-30 NOTE — ED PROVIDER NOTES
12/30/24   2:48 AM     I was asked to evaluate  this patient by Pamela, the house supervisor.  Bam Orlando is a 43 yo male here with CP, SOB, body aches, headache for three days.  He has a history of abdominal pain, N/V with two CT scans implying gastritis vs ulcer, no history of EGD and has not seen GI.    Labs show CBC with WBC 3700, CMP okay, CRP normal, INR normal, TSH normal, BNP normal, ethanol zero, 4 Plex negative, troponin negative, VBG normal, UDS negative, UA negative, lactic acid normal, D dimer negative.    Chest xray negative.  Abdominal xray shows moderate to large stool burden.  CT head negative.  CTA chest negative.    Tick borne disease pending.    He has been given Seroquel, IVF, clonidine, hydralazine, Benadryl, Decadron, Toradol, Reglan, Dilaudid, Maalox, Zofran.    ED Course    2:58 AM  His BP has been very labile.  Patient Vitals for the past 24 hrs:   BP Temp Temp src Pulse Resp SpO2 Height Weight   12/30/24 0243 120/80 -- -- -- -- -- -- --   12/30/24 0134 (!) 131/117 -- -- -- -- -- -- --   12/30/24 0120 107/79 -- -- 76 -- 97 % -- --   12/29/24 2340 118/81 -- -- 79 12 93 % -- --   12/29/24 2339 118/81 -- -- 80 17 98 % -- --   12/29/24 2259 (!) 168/157 -- -- 79 12 96 % -- --   12/29/24 2230 -- -- -- 79 14 95 % -- --   12/29/24 2215 -- -- -- 80 11 94 % -- --   12/29/24 2200 -- -- -- 79 23 95 % -- --   12/29/24 2145 -- -- -- 83 (!) 9 93 % -- --   12/29/24 2130 (!) 216/171 -- -- 92 11 95 % -- --   12/29/24 1900 -- -- -- 105 12 95 % -- --   12/29/24 1845 (!) 181/165 -- -- 101 (!) 9 96 % -- --   12/29/24 1830 (!) 251/119 -- -- 101 12 95 % -- --   12/29/24 1822 (!) 215/199 -- -- 96 27 96 % -- --   12/29/24 1730 -- -- -- 97 (!) 6 96 % -- --   12/29/24 1715 (!) 176/95 -- -- 103 10 97 % -- --   12/29/24 1700 -- -- -- 100 (!) 32 -- -- --   12/29/24 1645 (!) 197/121 -- -- 100 11 -- -- --   12/29/24 1527 (!) 157/104 -- -- -- -- -- -- --   12/29/24 1515 -- -- -- 105 10 94 % -- --   12/29/24 1500 -- --  "-- 105 11 96 % -- --   12/29/24 1445 -- -- -- 106 10 96 % -- --   12/29/24 1430 -- -- -- 106 11 96 % -- --   12/29/24 1415 -- -- -- 104 12 97 % -- --   12/29/24 1400 (!) 142/104 -- -- 107 -- 97 % -- --   12/29/24 1345 -- -- -- 110 (!) 9 96 % -- --   12/29/24 1330 -- -- -- 105 10 96 % -- --   12/29/24 1322 -- -- -- 120 20 99 % -- --   12/29/24 1321 (!) 133/112 98.5  F (36.9  C) Temporal -- 18 -- 1.727 m (5' 8\") 63.5 kg (140 lb)     3:13 AM  He seems comfortable, has normal VS now on room air and his labs look okay. I did place a referral for an EGD (procedure only GI referral to GI) and told him we will call if the tick borne illness panel is positive.     Diagnosis    (D72.819) Leukopenia, unspecified type    (K86.1) Chronic pancreatitis, unspecified pancreatitis type (H)    (R10.84) Generalized abdominal pain      Plan: discharge            Franki Gonsalez MD  12/30/24 6865    "

## 2024-12-30 NOTE — DISCHARGE INSTRUCTIONS
Bam    We will call if the tick testing is positive.    I did place a referral for an EGD.    Thank you for choosing our Emergency Department for your care.     You may receive a phone call or letter for a survey about your care in our ED.  Please complete this as this is how we improve care for our patients.     If you have any questions after leaving the ED you can call or text me on my cell phone at [phone].  I am not on call so if I do not answer my phone please leave a message- I will get back to you.  If you are not doing well please return to the ED.     Sincerely,    Dr Andrzej Gonsalez M.D.  Medical Director  [facility]

## 2024-12-31 LAB
A PHAGOCYTOPH DNA BLD QL NAA+PROBE: NOT DETECTED
B BURGDOR IGG+IGM SER QL: 0.25
BABESIA DNA BLD QL NAA+PROBE: NOT DETECTED
EHRLICHIA DNA SPEC QL NAA+PROBE: NOT DETECTED

## 2025-01-14 ENCOUNTER — HOSPITAL ENCOUNTER (OUTPATIENT)
Facility: OTHER | Age: 45
End: 2025-01-14
Attending: SURGERY | Admitting: SURGERY
Payer: COMMERCIAL

## (undated) RX ORDER — HYDRALAZINE HYDROCHLORIDE 20 MG/ML
INJECTION INTRAMUSCULAR; INTRAVENOUS
Status: DISPENSED
Start: 2024-12-29

## (undated) RX ORDER — DEXAMETHASONE SODIUM PHOSPHATE 10 MG/ML
INJECTION, SOLUTION INTRAMUSCULAR; INTRAVENOUS
Status: DISPENSED
Start: 2024-12-29

## (undated) RX ORDER — PANTOPRAZOLE SODIUM 40 MG/10ML
INJECTION, POWDER, LYOPHILIZED, FOR SOLUTION INTRAVENOUS
Status: DISPENSED
Start: 2024-08-18

## (undated) RX ORDER — DIPHENHYDRAMINE HYDROCHLORIDE 50 MG/ML
INJECTION INTRAMUSCULAR; INTRAVENOUS
Status: DISPENSED
Start: 2024-12-29

## (undated) RX ORDER — HYDROMORPHONE HYDROCHLORIDE 1 MG/ML
INJECTION, SOLUTION INTRAMUSCULAR; INTRAVENOUS; SUBCUTANEOUS
Status: DISPENSED
Start: 2024-12-19

## (undated) RX ORDER — PANTOPRAZOLE SODIUM 40 MG/10ML
INJECTION, POWDER, LYOPHILIZED, FOR SOLUTION INTRAVENOUS
Status: DISPENSED
Start: 2024-12-19

## (undated) RX ORDER — MAGNESIUM HYDROXIDE/ALUMINUM HYDROXICE/SIMETHICONE 120; 1200; 1200 MG/30ML; MG/30ML; MG/30ML
SUSPENSION ORAL
Status: DISPENSED
Start: 2024-12-29

## (undated) RX ORDER — ONDANSETRON 2 MG/ML
INJECTION INTRAMUSCULAR; INTRAVENOUS
Status: DISPENSED
Start: 2024-12-19

## (undated) RX ORDER — CLONIDINE HYDROCHLORIDE 0.1 MG/1
TABLET ORAL
Status: DISPENSED
Start: 2024-12-29

## (undated) RX ORDER — SODIUM CHLORIDE, SODIUM LACTATE, POTASSIUM CHLORIDE, CALCIUM CHLORIDE 600; 310; 30; 20 MG/100ML; MG/100ML; MG/100ML; MG/100ML
INJECTION, SOLUTION INTRAVENOUS
Status: DISPENSED
Start: 2024-08-18

## (undated) RX ORDER — MAGNESIUM SULFATE HEPTAHYDRATE 40 MG/ML
INJECTION, SOLUTION INTRAVENOUS
Status: DISPENSED
Start: 2024-08-20

## (undated) RX ORDER — HYDROMORPHONE HYDROCHLORIDE 1 MG/ML
INJECTION, SOLUTION INTRAMUSCULAR; INTRAVENOUS; SUBCUTANEOUS
Status: DISPENSED
Start: 2024-08-20

## (undated) RX ORDER — KETOROLAC TROMETHAMINE 30 MG/ML
INJECTION, SOLUTION INTRAMUSCULAR; INTRAVENOUS
Status: DISPENSED
Start: 2024-12-29

## (undated) RX ORDER — HYDROXYZINE PAMOATE 25 MG/1
CAPSULE ORAL
Status: DISPENSED
Start: 2024-08-18

## (undated) RX ORDER — ONDANSETRON 2 MG/ML
INJECTION INTRAMUSCULAR; INTRAVENOUS
Status: DISPENSED
Start: 2024-12-29

## (undated) RX ORDER — LIDOCAINE HYDROCHLORIDE 20 MG/ML
SOLUTION OROPHARYNGEAL
Status: DISPENSED
Start: 2024-12-29

## (undated) RX ORDER — QUETIAPINE FUMARATE 100 MG/1
TABLET, FILM COATED ORAL
Status: DISPENSED
Start: 2024-12-30

## (undated) RX ORDER — KETOROLAC TROMETHAMINE 15 MG/ML
INJECTION, SOLUTION INTRAMUSCULAR; INTRAVENOUS
Status: DISPENSED
Start: 2024-08-20

## (undated) RX ORDER — METOCLOPRAMIDE HYDROCHLORIDE 5 MG/ML
INJECTION INTRAMUSCULAR; INTRAVENOUS
Status: DISPENSED
Start: 2024-12-29

## (undated) RX ORDER — SUCRALFATE 1 G/1
TABLET ORAL
Status: DISPENSED
Start: 2024-12-19

## (undated) RX ORDER — HYDROXYZINE PAMOATE 25 MG/1
CAPSULE ORAL
Status: DISPENSED
Start: 2024-12-29

## (undated) RX ORDER — HYDROMORPHONE HYDROCHLORIDE 1 MG/ML
INJECTION, SOLUTION INTRAMUSCULAR; INTRAVENOUS; SUBCUTANEOUS
Status: DISPENSED
Start: 2024-12-30

## (undated) RX ORDER — KETOROLAC TROMETHAMINE 15 MG/ML
INJECTION, SOLUTION INTRAMUSCULAR; INTRAVENOUS
Status: DISPENSED
Start: 2024-08-18